# Patient Record
Sex: FEMALE | Race: WHITE | HISPANIC OR LATINO | Employment: FULL TIME | URBAN - METROPOLITAN AREA
[De-identification: names, ages, dates, MRNs, and addresses within clinical notes are randomized per-mention and may not be internally consistent; named-entity substitution may affect disease eponyms.]

---

## 2017-12-29 ENCOUNTER — ALLSCRIPTS OFFICE VISIT (OUTPATIENT)
Dept: OTHER | Facility: OTHER | Age: 27
End: 2017-12-29

## 2017-12-29 ENCOUNTER — LAB REQUISITION (OUTPATIENT)
Dept: LAB | Facility: HOSPITAL | Age: 27
End: 2017-12-29
Payer: COMMERCIAL

## 2017-12-29 DIAGNOSIS — R10.2 PELVIC AND PERINEAL PAIN: ICD-10-CM

## 2017-12-29 DIAGNOSIS — Z11.3 ENCOUNTER FOR SCREENING FOR INFECTIONS WITH PREDOMINANTLY SEXUAL MODE OF TRANSMISSION: ICD-10-CM

## 2017-12-29 DIAGNOSIS — Z01.419 ENCOUNTER FOR GYNECOLOGICAL EXAMINATION WITHOUT ABNORMAL FINDING: ICD-10-CM

## 2017-12-29 PROCEDURE — 87491 CHLMYD TRACH DNA AMP PROBE: CPT | Performed by: NURSE PRACTITIONER

## 2017-12-29 PROCEDURE — G0145 SCR C/V CYTO,THINLAYER,RESCR: HCPCS | Performed by: NURSE PRACTITIONER

## 2017-12-29 PROCEDURE — 87591 N.GONORRHOEAE DNA AMP PROB: CPT | Performed by: NURSE PRACTITIONER

## 2018-01-03 LAB
CHLAMYDIA DNA CVX QL NAA+PROBE: NORMAL
N GONORRHOEA DNA GENITAL QL NAA+PROBE: NORMAL

## 2018-01-05 ENCOUNTER — GENERIC CONVERSION - ENCOUNTER (OUTPATIENT)
Dept: OTHER | Facility: OTHER | Age: 28
End: 2018-01-05

## 2018-01-08 ENCOUNTER — GENERIC CONVERSION - ENCOUNTER (OUTPATIENT)
Dept: OTHER | Facility: OTHER | Age: 28
End: 2018-01-08

## 2018-01-08 LAB
LAB AP GYN PRIMARY INTERPRETATION: NORMAL
LAB AP LMP: NORMAL
Lab: NORMAL

## 2018-01-23 VITALS
HEIGHT: 64 IN | SYSTOLIC BLOOD PRESSURE: 120 MMHG | WEIGHT: 165 LBS | DIASTOLIC BLOOD PRESSURE: 60 MMHG | BODY MASS INDEX: 28.17 KG/M2

## 2018-01-23 NOTE — MISCELLANEOUS
Message   Recorded as Task   Date: 01/05/2018 08:04 AM, Created By: Latrice Hand   Task Name: Follow Up   Assigned To: Lencho Duncan   Regarding Patient: Law Trent, Status: In Progress   Comment:    Trinh Katz - 05 Jan 2018 8:04 AM     TASK CREATED  pls notify gc/ct were nml,thx   Oneda Roes - 05 Jan 2018 8:04 AM     TASK IN PROGRESS   Oneda Roes - 05 Jan 2018 8:05 AM     TASK EDITED  results given        Active Problems    1  Encounter for gynecological examination with abnormal finding (V72 31) (Z01 411)   2  Pelvic pain in female (625 9) (R10 2)    Current Meds   1  Aviane 0 1-20 MG-MCG Oral Tablet; TAKE 1 TABLET DAILY AS DIRECTED; Therapy: 21LBD1690 to ([id number])  Requested for: 04Buq3496; Last   Rx:[id number] Ordered   2  Fluconazole 150 MG Oral Tablet (Diflucan); TAKE 1 TABLET 1 TIME ONLY; Therapy: 55RHW0397 to (Evaluate:01Jan2018)  Requested for: 49Dcm1145; Last   Rx:[id number] Ordered   3  Xanax TABS (ALPRAZolam); Therapy: (Recorded:29Dec2017) to Recorded    Allergies    1   No Known Drug Allergies    Signatures   Electronically signed by : Maciel De Souza, ; Jan 5 2018  8:05AM EST                       (Author)

## 2018-01-23 NOTE — RESULT NOTES
Verified Results  (1) CHLAMYDIA/GC AMPLIFIED DNA, PCR [id number] 09:18AM Trinh Katz     Test Name Result Flag Reference   CHLAMYDIA,AMPLIFIED DNA PROBE   C  trachomatis Amplified DNA Negative   C  trachomatis Amplified DNA Negative   N  GONORRHOEAE AMPLIFIED DNA   N  gonorrhoeae Amplified DNA Negative   N  gonorrhoeae Amplified DNA Negative

## 2018-01-23 NOTE — RESULT NOTES
Verified Results  (1) THIN PREP PAP WITH IMAGING 30Wgr4660 09:19AM Ragini Alston Order Number: [id number]     Test Name Result Flag Reference   LAB AP CASE REPORT (Report)     Gynecologic Cytology Report            Case: [id number]                  Authorizing Provider: TIMA Faulkner    Collected:      12/29/2017 0919        First Screen:     LAURENCE Collier    Received:      01/04/2018 4161        Specimen:  LIQUID-BASED PAP, SCREENING, Cervix   LAB AP GYN PRIMARY INTERPRETATION      Negative for intraepithelial lesion or malignancy  Electronically signed by LAURENCE Collier on 1/8/2018 at 2:06 PM   LAB AP GYN SPECIMEN ADEQUACY      Satisfactory for evaluation  Endocervical/transformation zone component present  LAB AP GYN ADDITIONAL INFORMATION (Report)     [facility]'s FDA approved ,  and ThinPrep Imaging System are   utilized with strict adherence to the 's instruction manual to   prepare gynecologic and non-gynecologic cytology specimens for the   production of ThinPrep slides as well as for gynecologic ThinPrep imaging  These processes have been validated by our laboratory and/or by the     The Pap test is not a diagnostic procedure and should not be used as the   sole means to detect cervical cancer  It is only a screening procedure to   aid in the detection of cervical cancer and its precursors  Both   false-negative and false-positive results have been experienced  Your   patient's test result should be interpreted in this context together with   the history and clinical findings     LAB AP LMP 12/15/2017

## 2018-05-15 ENCOUNTER — TELEPHONE (OUTPATIENT)
Dept: OBGYN CLINIC | Age: 28
End: 2018-05-15

## 2018-05-15 DIAGNOSIS — Z30.41 ENCOUNTER FOR SURVEILLANCE OF CONTRACEPTIVE PILLS: Primary | ICD-10-CM

## 2018-05-15 RX ORDER — LEVONORGESTREL AND ETHINYL ESTRADIOL 0.1-0.02MG
1 KIT ORAL DAILY
Qty: 28 TABLET | Refills: 0 | Status: SHIPPED | OUTPATIENT
Start: 2018-05-15 | End: 2018-06-26 | Stop reason: SDUPTHER

## 2018-05-15 NOTE — TELEPHONE ENCOUNTER
Spoke with Pt today via phone call  Pt informed that her prescription for Aviane (OCP), will be forwarded electronically to Pt's pharmacy listed in EHR, pending TIMA Katz's signature  Reiterated to Pt that if she has any questions/concerns, to contact office

## 2018-05-15 NOTE — TELEPHONE ENCOUNTER
Pt called stating she lost her B/C pills and wanted to know if we could call in another RX  Pharmacy [facility] - [address], [place]

## 2018-06-26 DIAGNOSIS — Z30.41 ENCOUNTER FOR SURVEILLANCE OF CONTRACEPTIVE PILLS: ICD-10-CM

## 2018-06-26 RX ORDER — LEVONORGESTREL AND ETHINYL ESTRADIOL 0.1-0.02MG
KIT ORAL
Qty: 28 TABLET | Refills: 6 | Status: SHIPPED | OUTPATIENT
Start: 2018-06-26 | End: 2019-01-06 | Stop reason: SDUPTHER

## 2018-07-24 ENCOUNTER — TELEPHONE (OUTPATIENT)
Dept: OBGYN CLINIC | Age: 28
End: 2018-07-24

## 2018-07-24 DIAGNOSIS — N76.0 ACUTE VAGINITIS: Primary | ICD-10-CM

## 2018-07-24 RX ORDER — FLUCONAZOLE 150 MG/1
150 TABLET ORAL ONCE
Qty: 3 TABLET | Refills: 0 | Status: SHIPPED | OUTPATIENT
Start: 2018-07-24 | End: 2018-07-24

## 2018-07-24 NOTE — TELEPHONE ENCOUNTER
Pt  Would like for you to call in something for her  She has itching and a slight odor  Please advise    cv

## 2018-10-19 ENCOUNTER — TELEPHONE (OUTPATIENT)
Dept: OBGYN CLINIC | Age: 28
End: 2018-10-19

## 2018-10-19 NOTE — TELEPHONE ENCOUNTER
Pt would like a refill on diflucan    She is experiencing itching all day long   No burning or discharge    Please call pt

## 2018-11-19 ENCOUNTER — TELEPHONE (OUTPATIENT)
Dept: OBGYN CLINIC | Age: 28
End: 2018-11-19

## 2018-11-19 NOTE — TELEPHONE ENCOUNTER
Pt wants a std script, she has itching and discharge  She would like to speak with a nurse and a lab script      Thank you

## 2019-01-06 DIAGNOSIS — Z30.41 ENCOUNTER FOR SURVEILLANCE OF CONTRACEPTIVE PILLS: ICD-10-CM

## 2019-01-07 RX ORDER — LEVONORGESTREL AND ETHINYL ESTRADIOL 0.1-0.02MG
KIT ORAL
Qty: 28 TABLET | Refills: 0 | Status: SHIPPED | OUTPATIENT
Start: 2019-01-07 | End: 2019-01-25 | Stop reason: SDUPTHER

## 2019-01-25 ENCOUNTER — ANNUAL EXAM (OUTPATIENT)
Dept: OBGYN CLINIC | Age: 29
End: 2019-01-25
Payer: COMMERCIAL

## 2019-01-25 VITALS
SYSTOLIC BLOOD PRESSURE: 110 MMHG | DIASTOLIC BLOOD PRESSURE: 60 MMHG | HEIGHT: 63 IN | BODY MASS INDEX: 28.88 KG/M2 | WEIGHT: 163 LBS

## 2019-01-25 DIAGNOSIS — B37.49 CANDIDA INFECTION OF GENITAL REGION: ICD-10-CM

## 2019-01-25 DIAGNOSIS — Z01.419 ENCOUNTER FOR GYNECOLOGICAL EXAMINATION WITHOUT ABNORMAL FINDING: Primary | ICD-10-CM

## 2019-01-25 DIAGNOSIS — Z30.41 ENCOUNTER FOR SURVEILLANCE OF CONTRACEPTIVE PILLS: ICD-10-CM

## 2019-01-25 DIAGNOSIS — Z11.3 SCREENING FOR STD (SEXUALLY TRANSMITTED DISEASE): ICD-10-CM

## 2019-01-25 PROCEDURE — 87491 CHLMYD TRACH DNA AMP PROBE: CPT | Performed by: NURSE PRACTITIONER

## 2019-01-25 PROCEDURE — 99395 PREV VISIT EST AGE 18-39: CPT | Performed by: NURSE PRACTITIONER

## 2019-01-25 PROCEDURE — 87591 N.GONORRHOEAE DNA AMP PROB: CPT | Performed by: NURSE PRACTITIONER

## 2019-01-25 RX ORDER — LEVONORGESTREL AND ETHINYL ESTRADIOL 0.1-0.02MG
1 KIT ORAL DAILY
Qty: 84 TABLET | Refills: 3 | Status: SHIPPED | OUTPATIENT
Start: 2019-01-25 | End: 2019-07-01 | Stop reason: SDUPTHER

## 2019-01-25 RX ORDER — FLUCONAZOLE 150 MG/1
150 TABLET ORAL ONCE
Qty: 2 TABLET | Refills: 2 | Status: SHIPPED | OUTPATIENT
Start: 2019-01-25 | End: 2019-01-25

## 2019-01-25 NOTE — PATIENT INSTRUCTIONS
Vulvovaginal Candidiasis   AMBULATORY CARE:   Vulvovaginal candidiasis,  or yeast infection, is a common vaginal infection  Vulvovaginal candidiasis is caused by a fungus, or yeast-like germ  Fungi are normally found in your vagina  When there are too many fungi, it can cause an infection  Seek care immediately if:   · You have fever and chills  · You are bleeding from your vagina and it is not your monthly period  · You develop abdominal or pelvic pain  Contact your healthcare provider if:   · Your signs and symptoms get worse, even after treatment  · You have questions or concerns about your condition or care  Signs and symptoms:   · Thick, white, cheese-like discharge from your vagina    · Itching, swelling, or redness in your vagina    · Burning when you urinate  Treatment for vulvovaginal candidiasis  includes medicines to treat the fungal infection and decrease inflammation  The medicine may be a pill, topical cream, or vaginal suppository  Manage your symptoms:   · Wear cotton underwear  · Keep the vaginal area clean and dry  · Do not have sex until your symptoms are gone  · Do not douche  · Do not use feminine hygiene sprays, powders, or bubble bath  Prevent another infection:   · Take showers instead of baths  · Eat yogurt  · Limit the amount of alcohol you drink  · Control your blood sugar if you are diabetic  · Limit your time in hot tubs  Follow up with your healthcare provider as directed:  Write down your questions so you remember to ask them during your visits  © 2017 [employer] Information is for End User's use only and may not be sold, redistributed or otherwise used for commercial purposes  All illustrations and images included in CareNotes® are the copyrighted property of [employer]  or [employer]  The above information is an  only   It is not intended as medical advice for individual conditions or treatments  Talk to your doctor, nurse or pharmacist before following any medical regimen to see if it is safe and effective for you

## 2019-01-25 NOTE — PROGRESS NOTES
Diagnoses and all orders for this visit:    Encounter for gynecological examination without abnormal finding    Candida infection of genital region  -     fluconazole (DIFLUCAN) 150 mg tablet; Take 1 tablet (150 mg total) by mouth once for 1 dose Once and repeat in 3 days    Screening for STD (sexually transmitted disease)  -     Chlamydia/GC amplified DNA by PCR    Encounter for surveillance of contraceptive pills  -     levonorgestrel-ethinyl estradiol ([address]) 0 1-20 MG-MCG per tablet; Take 1 tablet by mouth daily    Other orders  -     Cancel: Liquid-based pap, screening          Calcium/vit d inclusion in the diet discussed, call with any issues, SBE reinforced, all concerns addressed  Pleasant 34 y o  premenopausal female here for annual exam  She denies any issues with bleeding or her menses  Reports regular cycles on ocp  Denies history of abnormal pap smears  Last Pap 12/2017, no pap today  Denies vaginal issues but gets occasional yeast infections  Denies pelvic pain-sxs from last yr resolved  Denies any issues with her BCM but sometimes gets crabtree-does not want to change ocp though  Sexually active without any concerns, recently had a new partner       Past Medical History:   Diagnosis Date    No known health problems      Past Surgical History:   Procedure Laterality Date    NO PAST SURGERIES       Family History   Problem Relation Age of Onset    Ovarian cancer Maternal Grandmother     Breast cancer Neg Hx     Colon cancer Neg Hx     Uterine cancer Neg Hx     Cervical cancer Neg Hx      Social History   Substance Use Topics    Smoking status: Former Smoker     Quit date: 2016    Smokeless tobacco: Never Used    Alcohol use Yes       Current Outpatient Prescriptions:     levonorgestrel-ethinyl estradiol (SRONYX) 0 1-20 MG-MCG per tablet, Take 1 tablet by mouth daily, Disp: 84 tablet, Rfl: 3    fluconazole (DIFLUCAN) 150 mg tablet, Take 1 tablet (150 mg total) by mouth once for 1 dose Once and repeat in 3 days, Disp: 2 tablet, Rfl: 2  Patient Active Problem List    Diagnosis Date Noted    Candida infection of genital region 2019    Encounter for gynecological examination without abnormal finding 2019       Allergies   Allergen Reactions    Venlafaxine        OB History    Para Term  AB Living   1 0     1 0   SAB TAB Ectopic Multiple Live Births           0      # Outcome Date GA Lbr Kaden/2nd Weight Sex Delivery Anes PTL Lv   1 AB               Finishing up on her masters in [employer] [employer]- 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  Vitals:    19 1100   BP: 110/60   BP Location: Right arm   Patient Position: Sitting   Weight: 73 9 kg (163 lb)   Height: 5' 3" (1 6 m)     Body mass index is 28 87 kg/m²  Review of Systems   Constitutional: Negative for chills, fatigue, fever and unexpected weight change  Respiratory: Negative for shortness of breath  Gastrointestinal: Negative for anal bleeding, blood in stool, constipation and diarrhea  Genitourinary: Negative for difficulty urinating, dysuria and hematuria  Physical Exam   Constitutional: She appears well-developed and well-nourished  No distress  HENT:   Head: Normocephalic  Neck: Normal range of motion  Neck supple  Pulmonary: Effort normal   Breasts: bilateral without masses, skin changes or nipple discharge  Bilaterally soft and warm to touch  No areas of erythema or pain  Abdominal: Soft  Pelvic exam was performed with patient supine  No labial fusion  There is no rash, tenderness, lesion or injury on the right labia  There is no rash, tenderness, lesion or injury on the left labia  Uterus is not deviated, not enlarged, not fixed and not tender  Cervix exhibits no motion tenderness, no discharge and no friability  Right adnexum displays no mass, no tenderness and no fullness  Left adnexum displays no mass, no tenderness and no fullness   No erythema or tenderness in the vagina  No foreign body in the vagina  No signs of injury around the vagina  No vaginal discharge found  Lymphadenopathy:        Right: No inguinal adenopathy present  Left: No inguinal adenopathy present

## 2019-01-28 LAB
C TRACH DNA SPEC QL NAA+PROBE: NEGATIVE
N GONORRHOEA DNA SPEC QL NAA+PROBE: NEGATIVE

## 2019-02-06 DIAGNOSIS — B37.3 YEAST INFECTION OF THE VAGINA: Primary | ICD-10-CM

## 2019-02-06 NOTE — TELEPHONE ENCOUNTER
Pt said at yly - she only had external itching  No dsch  Took 2 diflucan as directed - itching got worse  She still has no dsch, no odor, not swollen  Just itches  Cream - mycolog ? ?   Thanks

## 2019-02-06 NOTE — TELEPHONE ENCOUNTER
Pt will use internal cream for 7 night - also use it externally - on vulva - bid    Rx to [facility]

## 2019-02-06 NOTE — TELEPHONE ENCOUNTER
Pt was prescribed fluconazole 2 weeks ago and the itching has become increasingly worse  Pt would like for us to [facility]

## 2019-04-03 ENCOUNTER — TELEPHONE (OUTPATIENT)
Dept: OBGYN CLINIC | Facility: CLINIC | Age: 29
End: 2019-04-03

## 2019-04-05 ENCOUNTER — OFFICE VISIT (OUTPATIENT)
Dept: OBGYN CLINIC | Facility: CLINIC | Age: 29
End: 2019-04-05
Payer: COMMERCIAL

## 2019-04-05 VITALS
SYSTOLIC BLOOD PRESSURE: 104 MMHG | DIASTOLIC BLOOD PRESSURE: 76 MMHG | BODY MASS INDEX: 26.29 KG/M2 | HEIGHT: 64 IN | WEIGHT: 154 LBS

## 2019-04-05 DIAGNOSIS — N76.1 CHRONIC VAGINITIS: Primary | ICD-10-CM

## 2019-04-05 PROCEDURE — 87510 GARDNER VAG DNA DIR PROBE: CPT | Performed by: NURSE PRACTITIONER

## 2019-04-05 PROCEDURE — 87660 TRICHOMONAS VAGIN DIR PROBE: CPT | Performed by: NURSE PRACTITIONER

## 2019-04-05 PROCEDURE — 87480 CANDIDA DNA DIR PROBE: CPT | Performed by: NURSE PRACTITIONER

## 2019-04-05 PROCEDURE — 99214 OFFICE O/P EST MOD 30 MIN: CPT | Performed by: NURSE PRACTITIONER

## 2019-04-05 RX ORDER — NYSTATIN 100000 U/G
CREAM TOPICAL 2 TIMES DAILY
Qty: 30 G | Refills: 1 | Status: SHIPPED | OUTPATIENT
Start: 2019-04-05 | End: 2021-10-28 | Stop reason: ALTCHOICE

## 2019-04-05 RX ORDER — FLUCONAZOLE 150 MG/1
TABLET ORAL
Qty: 3 TABLET | Refills: 1 | Status: SHIPPED | OUTPATIENT
Start: 2019-04-05 | End: 2019-04-12

## 2019-04-07 LAB
CANDIDA RRNA VAG QL PROBE: NEGATIVE
G VAGINALIS RRNA GENITAL QL PROBE: POSITIVE
T VAGINALIS RRNA GENITAL QL PROBE: NEGATIVE

## 2019-04-08 ENCOUNTER — TELEPHONE (OUTPATIENT)
Dept: OBGYN CLINIC | Facility: CLINIC | Age: 29
End: 2019-04-08

## 2019-04-08 DIAGNOSIS — B96.89 BV (BACTERIAL VAGINOSIS): Primary | ICD-10-CM

## 2019-04-08 DIAGNOSIS — N76.0 BV (BACTERIAL VAGINOSIS): Primary | ICD-10-CM

## 2019-04-08 RX ORDER — METRONIDAZOLE 7.5 MG/G
1 GEL VAGINAL
Qty: 70 G | Refills: 0 | Status: SHIPPED | OUTPATIENT
Start: 2019-04-08 | End: 2019-04-13

## 2019-06-20 ENCOUNTER — TELEPHONE (OUTPATIENT)
Dept: OBGYN CLINIC | Facility: CLINIC | Age: 29
End: 2019-06-20

## 2019-06-25 ENCOUNTER — TELEPHONE (OUTPATIENT)
Dept: OBGYN CLINIC | Facility: CLINIC | Age: 29
End: 2019-06-25

## 2019-06-25 DIAGNOSIS — N76.0 BV (BACTERIAL VAGINOSIS): Primary | ICD-10-CM

## 2019-06-25 DIAGNOSIS — B96.89 BV (BACTERIAL VAGINOSIS): Primary | ICD-10-CM

## 2019-06-25 DIAGNOSIS — Z30.41 ENCOUNTER FOR SURVEILLANCE OF CONTRACEPTIVE PILLS: ICD-10-CM

## 2019-07-01 RX ORDER — LEVONORGESTREL AND ETHINYL ESTRADIOL 0.1-0.02MG
1 KIT ORAL DAILY
Qty: 84 TABLET | Refills: 1 | Status: SHIPPED | OUTPATIENT
Start: 2019-07-01 | End: 2019-07-29 | Stop reason: ALTCHOICE

## 2019-07-01 RX ORDER — METRONIDAZOLE 7.5 MG/G
1 GEL VAGINAL
Qty: 45 G | Refills: 0 | Status: SHIPPED | OUTPATIENT
Start: 2019-07-01 | End: 2019-07-06

## 2019-07-29 ENCOUNTER — OFFICE VISIT (OUTPATIENT)
Dept: OBGYN CLINIC | Facility: CLINIC | Age: 29
End: 2019-07-29
Payer: COMMERCIAL

## 2019-07-29 VITALS
HEIGHT: 64 IN | WEIGHT: 156 LBS | BODY MASS INDEX: 26.63 KG/M2 | SYSTOLIC BLOOD PRESSURE: 118 MMHG | DIASTOLIC BLOOD PRESSURE: 70 MMHG

## 2019-07-29 DIAGNOSIS — B37.49 CANDIDA INFECTION OF GENITAL REGION: ICD-10-CM

## 2019-07-29 DIAGNOSIS — N92.1 BREAKTHROUGH BLEEDING ON BIRTH CONTROL PILLS: Primary | ICD-10-CM

## 2019-07-29 DIAGNOSIS — Z11.3 SCREENING FOR STDS (SEXUALLY TRANSMITTED DISEASES): ICD-10-CM

## 2019-07-29 PROCEDURE — 99213 OFFICE O/P EST LOW 20 MIN: CPT | Performed by: NURSE PRACTITIONER

## 2019-07-29 PROCEDURE — 87491 CHLMYD TRACH DNA AMP PROBE: CPT | Performed by: NURSE PRACTITIONER

## 2019-07-29 PROCEDURE — 87591 N.GONORRHOEAE DNA AMP PROB: CPT | Performed by: NURSE PRACTITIONER

## 2019-07-29 RX ORDER — ALPRAZOLAM 0.25 MG/1
TABLET ORAL
COMMUNITY
Start: 2019-07-22

## 2019-07-29 RX ORDER — DESOGESTREL AND ETHINYL ESTRADIOL 0.15-0.03
1 KIT ORAL DAILY
Qty: 28 TABLET | Refills: 2 | Status: SHIPPED | OUTPATIENT
Start: 2019-07-29 | End: 2019-08-19 | Stop reason: ALTCHOICE

## 2019-07-29 RX ORDER — ESCITALOPRAM OXALATE 5 MG/1
TABLET ORAL
COMMUNITY
Start: 2019-07-22 | End: 2021-10-28 | Stop reason: ALTCHOICE

## 2019-07-29 RX ORDER — FLUCONAZOLE 150 MG/1
150 TABLET ORAL ONCE
Qty: 2 TABLET | Refills: 0 | Status: SHIPPED | OUTPATIENT
Start: 2019-07-29 | End: 2019-07-29

## 2019-07-29 NOTE — PATIENT INSTRUCTIONS
Birth Control Pills   WHAT YOU NEED TO KNOW:   What are birth control pills? Birth control pills are also called oral contraceptives, or the pill  It is medicine that helps prevent pregnancy  Birth control pills work by preventing ovulation  Ovulation is when the ovaries make and release an egg cell each month  If this egg gets fertilized by sperm, pregnancy occurs  Birth control pills may also help to prevent pregnancy by keeping sperm from fertilizing an egg  What may be done before I can start taking birth control pills? You need to see your healthcare provider to get a prescription  Any of the following may be done before your healthcare provider gives you a prescription:  · Your healthcare provider will ask you about diseases and illnesses you have had in the past  He will check your risk for blood clots, heart conditions, or stroke  He will also check your blood pressure, and may do a breast and pelvic exam  A Pap smear may also be done during the pelvic exam  This is a test to make sure you do not have abnormal changes on your cervix  You may need other tests, such as a urine test, to make sure you are not pregnant  · Your healthcare provider will ask if you take any medicines and if you smoke  Smoking increases your risk for stroke, heart attack, or a blood clot in your lungs  If you smoke, you should not take certain kinds of birth control pills  What are the advantages of birth control pills? When birth control pills are used correctly, the chances of getting pregnant are very low  Birth control pills may help decrease bleeding and pain during your monthly period  They may also help prevent cancer of the uterus and ovaries  What are the disadvantages of birth control pills? You may have sudden changes in your mood or feelings while you take birth control pills  You may have nausea and decreased sex drive  You may have an increased appetite and rapid weight gain   You may also have bleeding in between periods, less frequent periods, vaginal dryness, and breast pain  Birth control pills will not protect you from sexually transmitted infections  Rarely, some birth control pills can increase your risk for a blood clot  This may become life-threatening  What should I do if I decide I want to get pregnant? If you are planning to have a baby, ask your healthcare provider when you may stop taking your birth control pills  It may take some time for you to start ovulating again  Ask your healthcare provider for more information about pregnancy after birth control pills  When should I start taking birth control pills after I have a baby? If you are not breastfeeding, you may start taking birth control pills 3 weeks after you give birth  You may be able to take certain types of birth control pills if you are breastfeeding  These pills can be started from 6 weeks to 6 months after you give birth  Ask your healthcare provider for more information about when to start taking birth control pills after you give birth  When should I contact my healthcare provider? · You have forgotten to take a birth control pill  · You have mood changes, such as depression, since starting birth control pills  · You have nausea or you are vomiting  · You have severe abdominal pain  · You missed a period and have questions or concerns about being pregnant  · You still have bleeding 4 months after taking birth control pills correctly  · You have questions or concerns about your condition or care  When should I seek immediate care or call 911? · Your arm or leg feels warm, tender, and painful  It may look swollen and red  · You feel lightheaded, short of breath, and have chest pain  · You cough up blood      · You have any of the following signs of a stroke:      ¨ Numbness or drooping on one side of your face     ¨ Weakness in an arm or leg    ¨ Confusion or difficulty speaking    ¨ Dizziness, a severe headache, or vision loss    · You have severe pain, numbness, or swelling in your arms or legs  CARE AGREEMENT:   You have the right to help plan your care  Learn about your health condition and how it may be treated  Discuss treatment options with your caregivers to decide what care you want to receive  You always have the right to refuse treatment  The above information is an  only  It is not intended as medical advice for individual conditions or treatments  Talk to your doctor, nurse or pharmacist before following any medical regimen to see if it is safe and effective for you  © 2017 [facility] Information is for End User's use only and may not be sold, redistributed or otherwise used for commercial purposes  All illustrations and images included in CareNotes® are the copyrighted property of [employer] M [employer]  or [employer]

## 2019-07-31 LAB
C TRACH DNA SPEC QL NAA+PROBE: NEGATIVE
N GONORRHOEA DNA SPEC QL NAA+PROBE: NEGATIVE

## 2019-08-15 ENCOUNTER — TELEPHONE (OUTPATIENT)
Dept: OBGYN CLINIC | Facility: CLINIC | Age: 29
End: 2019-08-15

## 2019-08-15 DIAGNOSIS — IMO0001 CONTRACEPTION: Primary | ICD-10-CM

## 2019-08-15 NOTE — TELEPHONE ENCOUNTER
Pt was put on [facility] and she is having some bad side effects and would like to know if it can be change  Not sure if its 100% the pill, but hair falling out, mood up and down and have not got menstrual, or have a sex drive since she started the pill  Please call for more detail

## 2019-08-15 NOTE — TELEPHONE ENCOUNTER
Patient was seen on 07/29 for BTB  Her OCP was changed from Sronyx to Vidya - currently in the third week of her first pack  She states she does not like it  She is feeling lethargic, hair is falling out, crabtree and has no sex drive since starting this OCP  She wants to try [facility]  OK to change? *Patient aware no response till Monday

## 2019-08-19 RX ORDER — ETONOGESTREL AND ETHINYL ESTRADIOL 11.7; 2.7 MG/1; MG/1
INSERT, EXTENDED RELEASE VAGINAL
Qty: 3 EACH | Refills: 0 | Status: SHIPPED | OUTPATIENT
Start: 2019-08-19 | End: 2019-10-15 | Stop reason: ALTCHOICE

## 2019-08-19 NOTE — TELEPHONE ENCOUNTER
Contacted pt # [phone]- recv vm- per hippa communication on file left message advising pt as directed and to contact the office should she have any questions or concerns  Phone in nuvaring to [employer] on file   Please sign

## 2019-09-09 ENCOUNTER — APPOINTMENT (OUTPATIENT)
Dept: LAB | Facility: CLINIC | Age: 29
End: 2019-09-09
Payer: COMMERCIAL

## 2019-09-09 ENCOUNTER — OFFICE VISIT (OUTPATIENT)
Dept: OBGYN CLINIC | Age: 29
End: 2019-09-09
Payer: COMMERCIAL

## 2019-09-09 VITALS
DIASTOLIC BLOOD PRESSURE: 84 MMHG | WEIGHT: 158.2 LBS | BODY MASS INDEX: 28.03 KG/M2 | SYSTOLIC BLOOD PRESSURE: 118 MMHG | HEIGHT: 63 IN

## 2019-09-09 DIAGNOSIS — Z11.3 SCREENING FOR STD (SEXUALLY TRANSMITTED DISEASE): ICD-10-CM

## 2019-09-09 DIAGNOSIS — N76.1 CHRONIC VAGINITIS: ICD-10-CM

## 2019-09-09 LAB — RPR SER QL: NORMAL

## 2019-09-09 PROCEDURE — 86803 HEPATITIS C AB TEST: CPT

## 2019-09-09 PROCEDURE — 36415 COLL VENOUS BLD VENIPUNCTURE: CPT

## 2019-09-09 PROCEDURE — 86592 SYPHILIS TEST NON-TREP QUAL: CPT

## 2019-09-09 PROCEDURE — 87491 CHLMYD TRACH DNA AMP PROBE: CPT | Performed by: NURSE PRACTITIONER

## 2019-09-09 PROCEDURE — 87389 HIV-1 AG W/HIV-1&-2 AB AG IA: CPT

## 2019-09-09 PROCEDURE — 87591 N.GONORRHOEAE DNA AMP PROB: CPT | Performed by: NURSE PRACTITIONER

## 2019-09-09 PROCEDURE — 87660 TRICHOMONAS VAGIN DIR PROBE: CPT | Performed by: NURSE PRACTITIONER

## 2019-09-09 PROCEDURE — 99212 OFFICE O/P EST SF 10 MIN: CPT | Performed by: NURSE PRACTITIONER

## 2019-09-09 PROCEDURE — 87480 CANDIDA DNA DIR PROBE: CPT | Performed by: NURSE PRACTITIONER

## 2019-09-09 PROCEDURE — 87340 HEPATITIS B SURFACE AG IA: CPT

## 2019-09-09 PROCEDURE — 87510 GARDNER VAG DNA DIR PROBE: CPT | Performed by: NURSE PRACTITIONER

## 2019-09-09 RX ORDER — FLUCONAZOLE 150 MG/1
TABLET ORAL
Qty: 3 TABLET | Refills: 0 | Status: SHIPPED | OUTPATIENT
Start: 2019-09-09 | End: 2019-09-16

## 2019-09-09 RX ORDER — METHYLPHENIDATE HYDROCHLORIDE 18 MG/1
18 TABLET ORAL EVERY MORNING
Refills: 0 | COMMUNITY
Start: 2019-07-22 | End: 2021-10-28 | Stop reason: ALTCHOICE

## 2019-09-09 RX ORDER — CLOTRIMAZOLE 1 %
CREAM (GRAM) TOPICAL 2 TIMES DAILY
Qty: 30 G | Refills: 0 | Status: SHIPPED | OUTPATIENT
Start: 2019-09-09 | End: 2021-10-28 | Stop reason: ALTCHOICE

## 2019-09-09 NOTE — PATIENT INSTRUCTIONS
Safe Sex   AMBULATORY CARE:   Safe sex  is a combination of practices taken to prevent pregnancy and the spread of sexually transmitted infections (STIs)  These practices help to decrease or prevent the exchange of body fluids during sexual contact  Body fluids include saliva, urine, blood, vaginal fluids, and semen  All types of sex can cause STIs  This includes oral, vaginal, and anal sex  Seek care immediately if:   · A condom breaks, leaks, or slips off while you are having sex  · You notice sores on your penis, vagina, anal area, or skin around them  · You have had unsafe sex and want to discuss emergency contraception or treatment for STI exposure  Contact your healthcare provider if:   · You think you might be pregnant  · You have questions or concerns about your condition or care  How to practice safe sex:  Talk to your partner before you have sex  Ask about his or her sexual history and any current or past STI  · Use condoms and barrier methods for all types of sexual contact  Use a new condom or latex barrier each time you have sex  This includes oral, vaginal, and anal sex  Make sure that the condom fits and is put on correctly  Rubber latex sheets or dental dams can be used for oral sex  Ask your healthcare provider how to use these items and where to purchase them  If you are allergic to latex, use a nonlatex product such as polyurethane  · Limit your number of sexual partners  More than one sex partner can increase your risk for an STI  Do not have sex with anyone whose sexual history you do not know  · Do not do activities that can pass germs  Do not use saliva as a lubricant or share sex toys  · Tell your sex partner if you have an STI  Your partner may need to be tested and treated  Do not have sex while you are being treated for an STI, or with a partner who is being treated  · Get tested regularly for STIs    Get tested if you have had sexual contact with someone who has an STI  Get tested if you have unprotected sex with any new partner  · Get vaccinated  Vaccines may help to lower your risk for an STI such as HPV, hepatitis A, or hepatitis B  Ask your healthcare provider for more information on vaccines  Other ways to practice safe sex:   · Only use water-based lubricants during sex  Water-based lubricants may prevent sores or cuts in the vagina or penis  Prevent sores or cuts to decrease your risk for an STI  Do not use oil-based lubricants, such as baby oil or hand lotion, with latex condoms or barriers  These will weaken the latex and may cause it to break  · Do not use chemical irritants on condoms or genitals  Products that contain chemical irritants, such as spermicides, can irritate the lining of your vagina or rectum  Irritation may cause sores that may increase your risk for an STI  · Be careful when you have sex if you have open sores or cuts  Open sores or cuts may increase your risk for an STI  This includes new piercings and tattoos  Keep all open sores or cuts covered during sex  Do not have oral sex if you have cuts or sores in your mouth  Ask your healthcare provider when it is safe to have sex after you get a tattoo or piercing  · Do not use alcohol or drugs before sex  These substances can prevent you from thinking clearly and increase your risk for unsafe sex  Follow up with your healthcare provider as directed:  Write down your questions so you remember to ask them during your visits  © 2017 [employer] Information is for End User's use only and may not be sold, redistributed or otherwise used for commercial purposes  All illustrations and images included in CareNotes® are the copyrighted property of [employer]  or [employer]  The above information is an  only  It is not intended as medical advice for individual conditions or treatments   Talk to your doctor, nurse or pharmacist before following any medical regimen to see if it is safe and effective for you

## 2019-09-09 NOTE — PROGRESS NOTES
Diagnoses and all orders for this visit:    1  Screening for STD (sexually transmitted disease)  -     HIV 1/2 AG-AB combo; Future  -     Hepatitis B surface antigen; Future  -     Hepatitis C antibody; Future  -     RPR; Future  -     Chlamydia/GC amplified DNA by PCR  Reviewed safe sex practices  Will treat further based on results  2, Chronic vaginitis    -     fluconazole (DIFLUCAN) 150 mg tablet; Take 1 tablet on days 1,3 and 7   -     clotrimazole (LOTRIMIN) 1 % cream; Apply topically 2 (two) times a day for 14 days  -     VAGINOSIS DNA PROBE (AFFIRM)  Reviewed vaginal hygiene, no douching, excessive feminine products, scented soaps and lotions  Keep vaginal area cool and dry  Drink lots of water and watch sugar intake  Can try a probiotic for vaginal tiny health  Call if symptoms persists or gets worse  Other orders    -     methylphenidate (CONCERTA) 18 mg ER tablet; Take 18 mg by mouth every morning      Call if no symptom improvement, all questions answered, return for annual         Pleasant 34 y o  here for vaginal complaints  She c/o vaginal pain and discomfort, itching and mild odor which developed 2 weeks after unprotected intercourse with a new partner  She denies any treatments tried  Denies recent antibiotic use  Denies douching  Denies fever, pelvic pain or dyspareunia  She is requesting STD testing including blood work and Trichomonas testing due to her vaginal symptoms        Past Medical History:   Diagnosis Date    Anxiety     Depression     No known health problems      Past Surgical History:   Procedure Laterality Date    NO PAST SURGERIES       Social History     Tobacco Use    Smoking status: Former Smoker     Last attempt to quit: 2016     Years since quitting: 3 6    Smokeless tobacco: Never Used   Substance Use Topics    Alcohol use: Yes     Comment: socially     Drug use: Yes     Types: Marijuana     Family History   Problem Relation Age of Onset    Ovarian cancer Maternal Grandmother     Uterine cancer Maternal Grandmother     Cervical cancer Maternal Grandmother     No Known Problems Mother     Clotting disorder Father     No Known Problems Sister     Breast cancer Neg Hx     Colon cancer Neg Hx        Current Outpatient Medications:     ALPRAZolam (XANAX) 0 25 mg tablet, , Disp: , Rfl:     escitalopram (LEXAPRO) 5 mg tablet, , Disp: , Rfl:     methylphenidate (CONCERTA) 18 mg ER tablet, Take 18 mg by mouth every morning, Disp: , Rfl: 0    nystatin (MYCOSTATIN) cream, Apply topically 2 (two) times a day for 7 days To external labia, Disp: 30 g, Rfl: 1    clotrimazole (LOTRIMIN) 1 % cream, Apply topically 2 (two) times a day for 14 days, Disp: 30 g, Rfl: 0    etonogestrel-ethinyl estradiol (NUVARING) 0 12-0 015 MG/24HR vaginal ring, Insert vaginally and leave in place for 3 consecutive weeks, then remove for 1 week  (Patient not taking: Reported on 2019), Disp: 3 each, Rfl: 0    fluconazole (DIFLUCAN) 150 mg tablet, Take 1 tablet on days 1,3 and 7 , Disp: 3 tablet, Rfl: 0    No Known Allergies  OB History    Para Term  AB Living   1 0     1 0   SAB TAB Ectopic Multiple Live Births     1     0      # Outcome Date GA Lbr Kaden/2nd Weight Sex Delivery Anes PTL Lv   1 TAB                Vitals:    19 0742   BP: 118/84   BP Location: Right arm   Patient Position: Sitting   Cuff Size: Standard   Weight: 71 8 kg (158 lb 3 2 oz)   Height: 5' 3" (1 6 m)     Body mass index is 28 02 kg/m²  Review of Systems   Constitutional: Negative for chills, fatigue, fever and unexpected weight change  Respiratory: Negative for shortness of breath  Gastrointestinal: Negative for anal bleeding, blood in stool, constipation and diarrhea  Genitourinary: Negative for difficulty urinating, dysuria and hematuria  Physical Exam   Constitutional: She appears well-developed and well-nourished  No distress  Alert and oriented    HENT: atraumatic  Head: Normocephalic  Neck: Normal range of motion  Neck supple  Pulmonary: Effort normal   Abdominal: Soft  Pelvic exam was performed with patient supine  No labial fusion  There is erythema, mild tenderness, w/o lesion or injury on the right and left inner labia minora  Urethral meatus does not show any tenderness, inflammation or discharge  Palpation of midline bladder without pain or discomfort  Uterus is not deviated, not enlarged, not fixed and not tender  Cervix exhibits no motion tenderness, no discharge and no friability  Right adnexum displays no mass, no tenderness and no fullness  Left adnexum displays no mass, no tenderness and no fullness  No erythema or tenderness in the vagina  No foreign body in the vagina  No signs of injury around the vagina  Small amount of white vaginal discharge found  STD/Affirm cultures collected  Perineum and anus without areas of injury  No lesions noted or swelling  Lymphadenopathy:        Right: No inguinal adenopathy present  Left: No inguinal adenopathy present       PH 4 5, Negative WHIFF

## 2019-09-10 ENCOUNTER — TELEPHONE (OUTPATIENT)
Dept: OBGYN CLINIC | Facility: CLINIC | Age: 29
End: 2019-09-10

## 2019-09-10 DIAGNOSIS — B96.89 BV (BACTERIAL VAGINOSIS): Primary | ICD-10-CM

## 2019-09-10 DIAGNOSIS — N76.0 BV (BACTERIAL VAGINOSIS): Primary | ICD-10-CM

## 2019-09-10 LAB
C TRACH DNA SPEC QL NAA+PROBE: NEGATIVE
CANDIDA RRNA VAG QL PROBE: NEGATIVE
G VAGINALIS RRNA GENITAL QL PROBE: POSITIVE
HBV SURFACE AG SER QL: NORMAL
HCV AB SER QL: NORMAL
HIV 1+2 AB+HIV1 P24 AG SERPL QL IA: NORMAL
N GONORRHOEA DNA SPEC QL NAA+PROBE: NEGATIVE
T VAGINALIS RRNA GENITAL QL PROBE: NEGATIVE

## 2019-09-10 RX ORDER — METRONIDAZOLE 500 MG/1
500 TABLET ORAL EVERY 12 HOURS SCHEDULED
Qty: 14 TABLET | Refills: 0 | Status: SHIPPED | OUTPATIENT
Start: 2019-09-10 | End: 2019-09-17

## 2019-09-10 NOTE — TELEPHONE ENCOUNTER
----- Message from [address] sent at 9/10/2019  2:21 PM EDT -----  Please let pt know that her vaginal culture was positive for BV so rx sent for flagyl  No alcohol/intercourse while taking that medication and she can continue with other treatments as ordered as well  Call if symptoms do not improve    Thanks

## 2019-09-19 ENCOUNTER — TELEPHONE (OUTPATIENT)
Dept: OBGYN CLINIC | Facility: CLINIC | Age: 29
End: 2019-09-19

## 2019-09-19 NOTE — TELEPHONE ENCOUNTER
Pt called office today, left voicemail message stating "she wants to know results of recent STD tests "  Pt can be reached @ [phone]

## 2019-09-19 NOTE — TELEPHONE ENCOUNTER
Left lag that her STD results were negative per LD note and that a card was mailed to her 9/13/19  If she had any other questions or concerns to please call us back

## 2019-10-13 DIAGNOSIS — N92.1 BREAKTHROUGH BLEEDING ON BIRTH CONTROL PILLS: ICD-10-CM

## 2019-10-15 RX ORDER — DESOGESTREL AND ETHINYL ESTRADIOL 0.15-0.03
KIT ORAL
Qty: 84 TABLET | Refills: 1 | Status: SHIPPED | OUTPATIENT
Start: 2019-10-15 | End: 2020-08-25 | Stop reason: ALTCHOICE

## 2019-11-25 ENCOUNTER — OFFICE VISIT (OUTPATIENT)
Dept: OBGYN CLINIC | Age: 29
End: 2019-11-25
Payer: COMMERCIAL

## 2019-11-25 VITALS
BODY MASS INDEX: 27.64 KG/M2 | SYSTOLIC BLOOD PRESSURE: 112 MMHG | DIASTOLIC BLOOD PRESSURE: 64 MMHG | HEIGHT: 63 IN | WEIGHT: 156 LBS

## 2019-11-25 DIAGNOSIS — Z11.3 SCREEN FOR STD (SEXUALLY TRANSMITTED DISEASE): Primary | ICD-10-CM

## 2019-11-25 DIAGNOSIS — N89.8 VAGINAL ITCHING: ICD-10-CM

## 2019-11-25 PROBLEM — N76.0 ACUTE VAGINITIS: Status: ACTIVE | Noted: 2019-11-25

## 2019-11-25 PROCEDURE — 87661 TRICHOMONAS VAGINALIS AMPLIF: CPT | Performed by: NURSE PRACTITIONER

## 2019-11-25 PROCEDURE — 99213 OFFICE O/P EST LOW 20 MIN: CPT | Performed by: NURSE PRACTITIONER

## 2019-11-25 PROCEDURE — 87591 N.GONORRHOEAE DNA AMP PROB: CPT | Performed by: NURSE PRACTITIONER

## 2019-11-25 PROCEDURE — 87491 CHLMYD TRACH DNA AMP PROBE: CPT | Performed by: NURSE PRACTITIONER

## 2019-11-25 RX ORDER — CLINDAMYCIN AND BENZOYL PEROXIDE 10; 50 MG/G; MG/G
GEL TOPICAL
Refills: 2 | COMMUNITY
Start: 2019-09-26

## 2019-11-25 NOTE — PROGRESS NOTES
Diagnoses and all orders for this visit:    1  Screen for STD (sexually transmitted disease)/Vaginal itching  -     Chlamydia/GC amplified DNA by PCR  -     Hepatitis B surface antigen  -     Hepatitis C antibody  -     HIV 1/2 AG-AB combo  -     RPR  -     Herpes I /II IgM Ab Indriect  -     Herpes I/II IgG Antibodies  -     Trichomonas Vaginalis, RYAN    Patient instructed to call if vaginal symptoms do return  Safe sex practices reviewed  Also educated on HSV antibody testing  Other orders  -     clindamycin-benzoyl peroxide (BENZACLIN) gel; APPLY TO AFFECTED AREA TWICE A DAY IN THE MORNING AND IN THE EVENING  -     Cancel: POCT wet mount        All questions answered, return for annual         Pleasant 34 y o  here for vaginal complaints STD exposure concerns  She recently had some vaginal itching for about 2 days and then it stopped  This occurred after having unprotected intercourse with a new partner  She denies any vulvar or vaginal itching or irritation today, denies F/C/N/V,  recent antibiotic use  Denies douching  Denies  pelvic pain or dyspareunia  She is requesting ALL STD testing, reviewed checking with her insurance company first for STD blood work coverage, she requests also HSV testing and Trichomonas testing since she was having symptoms        Past Medical History:   Diagnosis Date    Anxiety     Depression     No known health problems      Past Surgical History:   Procedure Laterality Date    NO PAST SURGERIES       Social History     Tobacco Use    Smoking status: Former Smoker     Last attempt to quit: 2016     Years since quitting: 3 9    Smokeless tobacco: Never Used   Substance Use Topics    Alcohol use: Yes     Comment: socially     Drug use: Yes     Types: Marijuana     Family History   Problem Relation Age of Onset    Ovarian cancer Maternal Grandmother     Uterine cancer Maternal Grandmother     Cervical cancer Maternal Grandmother     No Known Problems Mother    Vernon Ugarte Clotting disorder Father     No Known Problems Sister     Breast cancer Neg Hx     Colon cancer Neg Hx        Current Outpatient Medications:     ALPRAZolam (XANAX) 0 25 mg tablet, , Disp: , Rfl:     clindamycin-benzoyl peroxide (BENZACLIN) gel, APPLY TO AFFECTED AREA TWICE A DAY IN THE MORNING AND IN THE EVENING, Disp: , Rfl: 2    clotrimazole (LOTRIMIN) 1 % cream, Apply topically 2 (two) times a day for 14 days, Disp: 30 g, Rfl: 0    escitalopram (LEXAPRO) 5 mg tablet, , Disp: , Rfl:     methylphenidate (CONCERTA) 18 mg ER tablet, Take 18 mg by mouth every morning, Disp: , Rfl: 0    APRI 0 15-30 MG-MCG per tablet, TAKE 1 TABLET BY MOUTH EVERY DAY (Patient not taking: Reported on 2019), Disp: 84 tablet, Rfl: 1    nystatin (MYCOSTATIN) cream, Apply topically 2 (two) times a day for 7 days To external labia, Disp: 30 g, Rfl: 1    No Known Allergies  OB History    Para Term  AB Living   1 0     1 0   SAB TAB Ectopic Multiple Live Births     1     0      # Outcome Date GA Lbr Kaden/2nd Weight Sex Delivery Anes PTL Lv   1 TAB                Vitals:    19 0851   BP: 112/64   BP Location: Right arm   Patient Position: Sitting   Weight: 70 8 kg (156 lb)   Height: 5' 3" (1 6 m)     Body mass index is 27 63 kg/m²  Review of Systems   Constitutional: Negative for chills, fatigue, fever and unexpected weight change  Respiratory: Negative for shortness of breath  Gastrointestinal: Negative for anal bleeding, blood in stool, constipation and diarrhea  Genitourinary: Negative for difficulty urinating, dysuria and hematuria  Physical Exam   Constitutional: She appears well-developed and well-nourished  No distress  Alert and oriented  HENT: atraumatic  Head: Normocephalic  Neck: Normal range of motion  Neck supple  Pulmonary: Effort normal   Abdominal: Soft  Pelvic exam was performed with patient supine  No labial fusion   There is no rash, tenderness, lesion or injury on the right labia  There is no rash, tenderness, lesion or injury on the left labia  Urethral meatus does not show any tenderness, inflammation or discharge  Palpation of midline bladder without pain or discomfort  Uterus is not deviated, not enlarged, not fixed and not tender  Cervix exhibits no motion tenderness, no discharge and no friability  Right adnexum displays no mass, no tenderness and no fullness  Left adnexum displays no mass, no tenderness and no fullness  No erythema or tenderness in the vagina  No foreign body in the vagina  No signs of injury around the vagina  Small amount of thick white vaginal discharge found  Perineum and anus without areas of injury  No lesions noted or swelling  Lymphadenopathy:        Right: No inguinal adenopathy present  Left: No inguinal adenopathy present

## 2019-11-25 NOTE — PATIENT INSTRUCTIONS
Vaginitis   WHAT YOU NEED TO KNOW:   What is vaginitis? Vaginitis is an inflammation or infection of the vagina  What causes vaginitis? Vaginitis is usually caused by bacteria, a virus, or a fungus  The chemicals in bubble baths, soaps, and perfumes can also cause vaginitis  A foreign body inside the vagina can also cause vaginitis  The infection may spread through sexual activity  It can also pass to a baby during birth  What increases my risk for vaginitis? · Diabetes mellitus that is not controlled    · Antibiotics, such as those used to treat fungal vaginitis     · Weakened immune system    · High estrogen levels, such as during pregnancy or from birth control pills    · Smoking    · New or multiple sex partners     · Sexual abuse    · Incorrect care of vagina, such as not wiping from front to back    · Use of spermicide or douche  What are the signs and symptoms of vaginitis? · Tenderness, itching, redness, and swelling     · Foul-smelling odor     · Thick, curd-like discharge     · Thin, gray-white discharge    · Small skin tears or chafing    · Painful sexual intercourse    · Pain when you urinate  How is vaginitis diagnosed? Your healthcare provider will ask about your signs and symptoms and examine you  A sample of discharge from your vagina will be tested for infection  How is vaginitis treated? · Antifungals  are used to treat a fungal infection  They may be given as a cream, gel, or tablet you insert into your vagina  · Antibiotics  are used to fight an infection caused by bacteria  What are the risks of vaginitis? Your infection may return  Left untreated, the bacteria or virus can spread  This can damage organs, such as your fallopian tubes  The infection can spread to your sexual partner if you do not have safe sex  The infection may lead to pregnancy complications, such as  birth or pelvic inflammatory disease  How can I manage my vaginitis?    · Wash your vagina  with mild soap and warm water each day  Gently dry the area after washing  · Do not douche  or insert other irritating products into your vagina  · Do not wear  tight-fitting clothes or undergarments  These can make your symptoms worse  · Do not have sex until your symptoms go away  When you have sex, always use a condom  Condoms can help protect you from contact with fluids from your partner that may be causing your vaginitis  How can I prevent vaginitis? · Wipe from front to back  after you urinate  · Do not use irritating products such as bubble baths or perfumed soaps  When should I contact my healthcare provider? · You have a fever  · You have abdominal pain  · Your symptoms get worse, even after treatment  · Your symptoms return  · You have questions or concerns about your condition or care  When should I seek immediate care? · You have unusual vaginal bleeding  · You have severe abdominal pain  CARE AGREEMENT:   You have the right to help plan your care  Learn about your health condition and how it may be treated  Discuss treatment options with your caregivers to decide what care you want to receive  You always have the right to refuse treatment  The above information is an  only  It is not intended as medical advice for individual conditions or treatments  Talk to your doctor, nurse or pharmacist before following any medical regimen to see if it is safe and effective for you  © 2017 [address] Information is for End User's use only and may not be sold, redistributed or otherwise used for commercial purposes  All illustrations and images included in CareNotes® are the copyrighted property of A D [employer] M [employer]  or [employer]

## 2019-11-26 LAB
C TRACH DNA SPEC QL NAA+PROBE: NEGATIVE
N GONORRHOEA DNA SPEC QL NAA+PROBE: NEGATIVE

## 2019-11-28 LAB — T VAGINALIS RRNA SPEC QL NAA+PROBE: NEGATIVE

## 2019-12-20 DIAGNOSIS — Z30.41 ENCOUNTER FOR SURVEILLANCE OF CONTRACEPTIVE PILLS: ICD-10-CM

## 2019-12-20 RX ORDER — LEVONORGESTREL AND ETHINYL ESTRADIOL 0.1-0.02MG
KIT ORAL
Qty: 84 TABLET | Refills: 1 | Status: SHIPPED | OUTPATIENT
Start: 2019-12-20 | End: 2020-08-25 | Stop reason: ALTCHOICE

## 2020-08-25 ENCOUNTER — ANNUAL EXAM (OUTPATIENT)
Dept: OBGYN CLINIC | Age: 30
End: 2020-08-25
Payer: COMMERCIAL

## 2020-08-25 VITALS
SYSTOLIC BLOOD PRESSURE: 116 MMHG | WEIGHT: 181 LBS | BODY MASS INDEX: 32.07 KG/M2 | DIASTOLIC BLOOD PRESSURE: 60 MMHG | HEIGHT: 63 IN

## 2020-08-25 DIAGNOSIS — R10.2 VAGINAL PAIN: ICD-10-CM

## 2020-08-25 DIAGNOSIS — N89.8 VAGINAL ITCHING: ICD-10-CM

## 2020-08-25 DIAGNOSIS — Z01.411 ENCOUNTER FOR GYNECOLOGICAL EXAMINATION WITH ABNORMAL FINDING: Primary | ICD-10-CM

## 2020-08-25 DIAGNOSIS — Z30.015 ENCOUNTER FOR INITIAL PRESCRIPTION OF VAGINAL RING HORMONAL CONTRACEPTIVE: ICD-10-CM

## 2020-08-25 PROCEDURE — G0145 SCR C/V CYTO,THINLAYER,RESCR: HCPCS | Performed by: PATHOLOGY

## 2020-08-25 PROCEDURE — G0124 SCREEN C/V THIN LAYER BY MD: HCPCS | Performed by: PATHOLOGY

## 2020-08-25 PROCEDURE — S0612 ANNUAL GYNECOLOGICAL EXAMINA: HCPCS | Performed by: NURSE PRACTITIONER

## 2020-08-25 RX ORDER — FLUCONAZOLE 150 MG/1
150 TABLET ORAL ONCE
Qty: 2 TABLET | Refills: 0 | Status: SHIPPED | OUTPATIENT
Start: 2020-08-25 | End: 2020-08-25

## 2020-08-25 RX ORDER — ETONOGESTREL AND ETHINYL ESTRADIOL 11.7; 2.7 MG/1; MG/1
INSERT, EXTENDED RELEASE VAGINAL
Qty: 1 EACH | Refills: 2 | Status: SHIPPED | OUTPATIENT
Start: 2020-08-25 | End: 2021-10-28 | Stop reason: ALTCHOICE

## 2020-08-25 NOTE — PROGRESS NOTES
Diagnoses and all orders for this visit:    Encounter for gynecological examination with abnormal finding  -     Liquid-based pap, screening    Vaginal pain  -     US pelvis complete w transvaginal; Future    Vaginal itching  -     fluconazole (DIFLUCAN) 150 mg tablet; Take 1 tablet (150 mg total) by mouth once for 1 dose Once and repeat in 3 days    Encounter for initial prescription of vaginal ring hormonal contraceptive  -     etonogestrel-ethinyl estradiol (NUVARING) 0 12-0 015 MG/24HR vaginal ring; Insert vaginally and leave in place for 3 consecutive weeks, then remove for 1 week  Calcium/vit d inclusion in the diet discussed, call with any issues, SBE reinforced, all concerns addressed  Pleasant 31 y o  premenopausal female here for annual exam  She denies any issues with bleeding or her menses  Reports regular cycles and would like to try NR since she did not do well with ocp  Denies history of abnormal pap smears  Last Pap  NIL, pap today  Denies vaginal issues other than occasional itching, she has a hx of frequent yeast  + chronic vaginal/pelvic pain, agrees it may be related to her constipation which she just started probiotics for  Sexually active without any concerns  Denies a new partner since last STD check in 2019      Past Medical History:   Diagnosis Date    Anxiety     Depression     No known health problems      Past Surgical History:   Procedure Laterality Date    NO PAST SURGERIES       Family History   Problem Relation Age of Onset    Ovarian cancer Maternal Grandmother     Uterine cancer Maternal Grandmother     Cervical cancer Maternal Grandmother     No Known Problems Mother     Clotting disorder Father     No Known Problems Sister     Breast cancer Neg Hx     Colon cancer Neg Hx      Social History     Tobacco Use    Smoking status: Former Smoker     Last attempt to quit: 2016     Years since quittin 6    Smokeless tobacco: Never Used   Substance Use Topics    Alcohol use: Yes     Frequency: Monthly or less    Drug use: Yes     Types: Marijuana       Current Outpatient Medications:     ALPRAZolam (XANAX) 0 25 mg tablet, , Disp: , Rfl:     clindamycin-benzoyl peroxide (BENZACLIN) gel, APPLY TO AFFECTED AREA TWICE A DAY IN THE MORNING AND IN THE EVENING, Disp: , Rfl: 2    clotrimazole (LOTRIMIN) 1 % cream, Apply topically 2 (two) times a day for 14 days, Disp: 30 g, Rfl: 0    escitalopram (LEXAPRO) 5 mg tablet, , Disp: , Rfl:     etonogestrel-ethinyl estradiol (NUVARING) 0 12-0 015 MG/24HR vaginal ring, Insert vaginally and leave in place for 3 consecutive weeks, then remove for 1 week , Disp: 1 each, Rfl: 2    fluconazole (DIFLUCAN) 150 mg tablet, Take 1 tablet (150 mg total) by mouth once for 1 dose Once and repeat in 3 days, Disp: 2 tablet, Rfl: 0    methylphenidate (CONCERTA) 18 mg ER tablet, Take 18 mg by mouth every morning, Disp: , Rfl: 0    nystatin (MYCOSTATIN) cream, Apply topically 2 (two) times a day for 7 days To external labia, Disp: 30 g, Rfl: 1  Patient Active Problem List    Diagnosis Date Noted    Acute vaginitis 2019    Screen for STD (sexually transmitted disease) 2019    Vaginal itching 2019    Screening for STD (sexually transmitted disease) 2019    Chronic vaginitis 2019    Candida infection of genital region 2019    Encounter for gynecological examination with abnormal finding 2019       No Known Allergies    OB History    Para Term  AB Living   1 0     1 0   SAB TAB Ectopic Multiple Live Births     1     0      # Outcome Date GA Lbr Kaden/2nd Weight Sex Delivery Anes PTL Lv   1 TAB              SW for [address] :    20 1614   BP: 116/60   BP Location: Right arm   Patient Position: Sitting   Weight: 82 1 kg (181 lb)   Height: 5' 3" (1 6 m)     Body mass index is 32 06 kg/m²      Review of Systems   Constitutional: Negative for chills, fatigue, fever and unexpected weight change  Respiratory: Negative for shortness of breath  Gastrointestinal: Negative for anal bleeding, blood in stool, constipation and diarrhea  Genitourinary: Negative for difficulty urinating, dysuria and hematuria  Physical Exam   Constitutional: She appears well-developed and well-nourished  No distress  HENT:   Head: Normocephalic  Neck: Normal range of motion  Neck supple  Pulmonary: Effort normal   Breasts: bilateral without masses, skin changes or nipple discharge  Bilaterally soft and warm to touch  No areas of erythema or pain  Abdominal: Soft  Pelvic exam was performed with patient supine  No labial fusion  There is no rash, tenderness, lesion or injury on the right labia  There is no rash, tenderness, lesion or injury on the left labia  Urethral meatus does not show any tenderness, inflammation or discharge  Palpation of midline bladder without pain or discomfort  Uterus is not deviated, not enlarged, not fixed and not tender  Cervix exhibits no motion tenderness, no discharge and no friability  Right adnexum displays no mass, no tenderness and no fullness  Left adnexum displays no mass, no tenderness and no fullness  No erythema or tenderness in the vagina  No foreign body in the vagina  No signs of injury around the vagina  No vaginal discharge found  No signs of injury around the vagina or anus  Perineum without lesions, signs of injury, erythema or swelling  Lymphadenopathy:        Right: No inguinal adenopathy present  Left: No inguinal adenopathy present

## 2020-08-25 NOTE — PATIENT INSTRUCTIONS
Etonogestrel/Ethinyl Estradiol (Into the vagina)   Ethinyl Estradiol (ETH-i-nil es-tra-DYE-ol), Etonogestrel (e-toe-aviva-AUGUSTUS-trel)  Prevents pregnancy  Brand Name(s): NuvaRing   There may be other brand names for this medicine  When This Medicine Should Not Be Used: This medicine is not right for everyone  Do not use it if you had an allergic reaction to etonogestrel or ethinyl estradiol, if you are pregnant, or if you have vaginal bleeding that has not been checked by a doctor, liver disease or tumors, breast cancer, problems with blood clots, or certain heart problems  How to Use This Medicine: Insert  · This medicine is in a ring that is put into your vagina  Your doctor or nurse will show you how to put in the ring  The ring is left in place for 3 weeks then removed and another inserted 1 week later  During the week without the ring, you will usually have your menstrual period  Follow directions about how to use the ring, and ask your doctor if you have questions  · The first time you start using the ring, you should also use a second form of birth control during the first 7 days to avoid pregnancy  Do not use a diaphragm, because the ring may affect how the diaphragm fits  · Read and follow the patient instructions that come with this medicine  Talk to your doctor or pharmacist if you have any questions  · Missed dose:   ¨ If NuvaRing® has slipped out and it has been out for less than 3 hours, rinse it in cool or lukewarm water and reinsert it  You should still be protected from pregnancy  If Harman Phoenix has been out for more than 3 hours, insert a new ring  You must use an extra method of birth control until the Harman Phoenix has been in place for 7 days in a row  Do not use a diaphragm  ¨ If you leave the vaginal ring in place for more than 4 weeks, you may not be protected from pregnancy  Make sure that you are not pregnant before you insert a new ring   You must use an additional form of birth control (not a diaphragm) until the new ring has been in place for 7 days in a row  ¨ If you forget to insert a new ring after the ring-free week, call your doctor for instructions  · Store the medicine in a closed container at room temperature, away from heat, moisture, and direct light  Place the used NuvaRing® in the re-sealable foil pouch and put it in the trash where children and pets cannot get to it  Do not flush the ring down the toilet  Drugs and Foods to Avoid:   Ask your doctor or pharmacist before using any other medicine, including over-the-counter medicines, vitamins, and herbal products  · Some foods and medicines can affect how this medicine works  Tell your doctor if you are also using Dallin's wort, acetaminophen, ascorbic acid, aprepitant, atorvastatin, boceprevir, bosentan, carbamazepine, clofibric acid, cyclosporine, felbamate, griseofulvin, lamotrigine, morphine, oxcarbazepine, phenobarbital, phenytoin, prednisolone, rifabutin, rifampicin, rufinamide, salicylic acid, telaprevir, temazepam, theophylline, tizanidine, or topiramate  · Also tell your doctor if you are using medicine to treat an infection (such as fluconazole, itraconazole, ketoconazole, miconazole, voriconazole), medicine to treat HIV, or thyroid medicines  · Do not eat grapefruit or drink grapefruit juice while you are using this medicine  Warnings While Using This Medicine:   · It is not safe to take this medicine during pregnancy  It could harm an unborn baby  Tell your doctor right away if you become pregnant  · Tell your doctor if you are breastfeeding, or if you recently had a baby, miscarriage, or   Tell your doctor if you have kidney disease, cervical cancer, diabetes, epilepsy, migraines, heart or blood vessel disease, high cholesterol, or high blood pressure  Also tell your doctor if you have a history of depression, chloasma gravidarum, jaundice during pregnancy, or toxic shock syndrome, or if you smoke    · This medicine may cause the following problems:  ¨ Higher risk of heart attack, stroke, or blood clots  ¨ Liver problems  ¨ High blood pressure  ¨ Gallbladder disease  ¨ Possible increased risk of breast or cervical cancer  · Tell any doctor who treats you that you are using this medicine  You may need to stop using this medicine before and after you have surgery, because of the risk of blood clots  · This medicine will not protect you from HIV/AIDS or other sexually transmitted infections  · Tell any doctor or dentist who treats you that you are using this medicine  This medicine may affect certain medical test results  · Your doctor will check the effects of this medicine at regular visits  Keep all appointments  · Keep all medicine out of the reach of children  Never share your medicine with anyone  Possible Side Effects While Using This Medicine:   Call your doctor right away if you notice any of these side effects:  · Allergic reaction: Itching or hives, swelling in your face or hands, swelling or tingling in your mouth or throat, chest tightness, trouble breathing  · Chest pain that may spread, trouble breathing, unusual sweating, fainting  · Dark urine or pale stools, nausea, vomiting, loss of appetite, stomach pain, yellow skin or eyes  · Fast, slow, or pounding heartbeat  · Numbness or weakness on one side of your body, pain in your lower leg, sudden or severe headache, problems with vision, speech, or walking  · Redness, pain, itching, or burning sensation inside your vagina  · Sudden high fever, diarrhea, dizziness, fainting, muscle aches, or a sunburn-like rash  · Unusual or unexpected vaginal bleeding or heavy bleeding  · Vision loss, double vision  If you notice these less serious side effects, talk with your doctor:   · Breast tenderness or swelling  · Depression, mood changes  If you notice other side effects that you think are caused by this medicine, tell your doctor     Call your doctor for medical advice about side effects  You may report side effects to FDA at 6-166-FDA-6377  © 2017 [employer] Information is for End User's use only and may not be sold, redistributed or otherwise used for commercial purposes  The above information is an  only  It is not intended as medical advice for individual conditions or treatments  Talk to your doctor, nurse or pharmacist before following any medical regimen to see if it is safe and effective for you

## 2020-09-02 LAB
LAB AP GYN PRIMARY INTERPRETATION: ABNORMAL
Lab: ABNORMAL
PATH INTERP SPEC-IMP: ABNORMAL

## 2020-09-04 ENCOUNTER — TELEPHONE (OUTPATIENT)
Dept: OBGYN CLINIC | Facility: CLINIC | Age: 30
End: 2020-09-04

## 2020-09-04 NOTE — TELEPHONE ENCOUNTER
----- Message from Leonora Solis, [address] sent at 9/3/2020  3:28 PM EDT -----  Please arrange for colposcopy, pap was LSIL  Thanks

## 2020-09-10 ENCOUNTER — TELEPHONE (OUTPATIENT)
Dept: OBGYN CLINIC | Facility: CLINIC | Age: 30
End: 2020-09-10

## 2020-09-10 NOTE — TELEPHONE ENCOUNTER
Pt returned call- reviewed pap results & need for colpo- explained procedure to pt- appt scheduled for 10/7/20 at [facility] with AL

## 2020-09-10 NOTE — TELEPHONE ENCOUNTER
CHIEF COMPLAINT  Mr. De Luna states that he has neck and shoulder pain that started in 2015. He states that he was diagnosed with schwannomata in 2014. He states that he currently has a tumors on the left side of his neck,both shoulder, both armpits. He states that he has had increased pain in his right shoulder that is a constant dull ache that radiates down his right arm to his elbow for the last 5 weeks.  He also gets occasional shooting pains down to his right elbow that radiates from his right armpit that is sometime hypersensitive. He gets burning and shooting pain in the left side of his neck that radiates to the left side of his scalp. He states that he goes to Neurologist Dr. Lencho Go and Neurosurgeon Dr. Laith Harding for his schwannomata.    Subjective   Isma De Luna is a 49 y.o. male.   He presents to the office for evaluation of chronic pain. He was referred here by Dr. Bonnie Benoit.         Neck Pain    This is a chronic problem. The current episode started more than 1 month ago. The problem occurs constantly. The problem has been gradually worsening. Associated with: NF. The pain is present in the midline and right side. The quality of the pain is described as aching, cramping and shooting. The pain is moderate. Associated symptoms include numbness. Pertinent negatives include no weakness. He has tried acetaminophen, bed rest, heat, ice, NSAIDs, oral narcotics, muscle relaxants, neck support and home exercises for the symptoms. The treatment provided mild relief.        PEG Assessment   What number best describes your pain on average in the past week?4  What number best describes how, during the past week, pain has interfered with your enjoyment of life?2  What number best describes how, during the past week, pain has interfered with your general activity?  2        Current Outpatient Prescriptions:   •  allopurinol (ZYLOPRIM) 100 MG tablet, TAKE 1 TABLET BY MOUTH EVERY DAY, Disp: 90 tablet, Rfl: 1  •  Pt called back and has some questions about colpo and results  Best call back [phone]     Thank you!  B Complex-Biotin-FA (HM VITAMIN B100 COMPLEX PO), Take 1 tablet by mouth daily., Disp: , Rfl:   •  carbonyl iron (FEOSOL) 45 MG tablet tablet, Take  by mouth daily., Disp: , Rfl:   •  Cholecalciferol (VITAMIN D3) 600 UNITS capsule capsule, Take 600 Units by mouth daily., Disp: , Rfl:   •  Cyanocobalamin 1000 MCG sublingual tablet, Place 1 tablet under the tongue daily., Disp: , Rfl:   •  cyclobenzaprine (FLEXERIL) 10 MG tablet, Take 1 tablet by mouth 3 (Three) Times a Day As Needed for Muscle Spasms., Disp: 90 tablet, Rfl: 4  •  DULoxetine (CYMBALTA) 30 MG capsule, Take 1 capsule by mouth 2 (Two) Times a Day., Disp: 60 capsule, Rfl: 11  •  fexofenadine (ALLEGRA) 180 MG tablet, Take 180 mg by mouth daily., Disp: , Rfl:   •  gabapentin (NEURONTIN) 800 MG tablet, Take 1 tablet by mouth 3 (Three) Times a Day., Disp: 90 tablet, Rfl: 5  •  HYDROcodone-acetaminophen (NORCO)  MG per tablet, Take 1 tablet by mouth Every 6 (Six) Hours As Needed for Moderate Pain ., Disp: 120 tablet, Rfl: 0  •  losartan (COZAAR) 100 MG tablet, TAKE 1 TABLET BY MOUTH DAILY, Disp: 90 tablet, Rfl: 1  •  Multiple Vitamin (MULTI-VITAMIN DAILY PO), Take  by mouth., Disp: , Rfl:   •  omeprazole (priLOSEC) 40 MG capsule, TAKE 1 CAPSULE BY MOUTH DAILY, Disp: 90 capsule, Rfl: 1  •  sildenafil (REVATIO) 20 MG tablet, Take 1 tablet by mouth Daily., Disp: 90 tablet, Rfl: 3    The following portions of the patient's history were reviewed and updated as appropriate: allergies, current medications, past family history, past medical history, past social history, past surgical history and problem list.      REVIEW OF PERTINENT MEDICAL DATA                                          Review of office encounter 10/30/2017 with Dr. Pagan at [facility]          Review of Systems   Constitutional: Negative for fatigue.   HENT: Positive for congestion.    Eyes: Negative for visual disturbance.   Respiratory: Negative for cough, shortness of  "breath and wheezing.    Cardiovascular: Negative.    Gastrointestinal: Positive for constipation. Negative for diarrhea.   Genitourinary: Negative for difficulty urinating.   Musculoskeletal: Positive for arthralgias (bilateral shoulders), back pain and neck pain.   Neurological: Positive for numbness. Negative for weakness.   Psychiatric/Behavioral: Negative for sleep disturbance and suicidal ideas. The patient is not nervous/anxious.          Vitals:    11/06/17 1011   BP: 149/90   Pulse: 87   Resp: 18   Temp: 98.1 °F (36.7 °C)   SpO2: 98%   Weight: 246 lb 3.2 oz (112 kg)   Height: 68\" (172.7 cm)   PainSc:   2   PainLoc: Shoulder         Objective   Physical Exam   Constitutional: He is oriented to person, place, and time. Vital signs are normal. He appears well-developed and well-nourished.  Non-toxic appearance. No distress.   HENT:   Head: Normocephalic and atraumatic.   Right Ear: Hearing and external ear normal.   Left Ear: Hearing and external ear normal.   Nose: Nose normal.   Eyes: Conjunctivae and lids are normal. Pupils are equal, round, and reactive to light.   Pulmonary/Chest: Effort normal. No respiratory distress.   Abdominal: Normal appearance.   Neurological: He is alert and oriented to person, place, and time. No cranial nerve deficit.   Psychiatric: He has a normal mood and affect. His behavior is normal.   Nursing note and vitals reviewed.      Assessment/Plan   Isma was seen today for neck pain and shoulder pain.    Diagnoses and all orders for this visit:    Chronic pain due to neoplasm  -     Urine Drug Screen Confirmation - Urine, Urine, Clean Catch  -     POC Urine Drug Screen, Triage    Neurofibromatosis    Neuropathic pain, arm    Brachial plexus neuropathy  -     HYDROcodone-acetaminophen (NORCO)  MG per tablet; Take 1 tablet by mouth Every 6 (Six) Hours As Needed for Moderate Pain .    Other orders  -     gabapentin (NEURONTIN) 800 MG tablet; Take 1 tablet by mouth 3 (Three) Times " a Day.  -     DULoxetine (CYMBALTA) 30 MG capsule; Take 1 capsule by mouth 2 (Two) Times a Day.  -     SCANNED - LABS        --- Follow-up 1 month  -- assume monitoring for Hydrocodone/apap, Gabapentin, Cymbalta.    A medication management agreement is signed by the patient and the provider today.  Reviewed with the patient that this contract is specific to controlled substances, specifically pain medication.  Reviewed core of pain medicine is to decrease pain and increase functional level.  Reviewed the medication must be picked up as a written prescription from this office and will not be called into any pharmacy.  Reviewed the use of Semaj within the office setting.  Reviewed causes for potential of discontinuation of opiates,  including but not limited to consideration for diversion, obtaining other controlled substances from other license practitioners, or  inappropriate office behavior.  Patient understands to use a controlled substance as prescribed by physician and to avoid improper use of controlled substances.  Patient will also verbalized understanding that prescriptions to be filled at the same pharmacy  unless office staff is made aware of this.  Patient understands they can be submitted to random urine drug screens and/or random pill counts on any request.  Patient understands they are not to receive early refills.  The patient must produce an official police report for any effort to replace controlled substances that are lost or stolen.  No use of illegal street drugs while receiving controlled substances from this prescribing provider.  The patient is to take medication as prescribed  and not deviate from the normal schedule without consultation from the provider.  Patient is not to share the medication with others or to take medication with alcohol or other sedatives.  Use caution when driving or operating machinery.  Alert office if the patient becomes pregnant or begins nursing a child.  Reviewed  the use of controlled substances recreates a risk for respiratory depression, which may result in serious harm or even death.  Must always keep the prescription in the original container.  Patient is to store controlled substances in a locked cabinet or other secure storage unit that is cool, dry and out of sunlight.  Patient must immediately notify office if any controlled substances is stolen or improperly taken by another individual.  Reviewed risk for physical dependence, tolerance and addiction.    -- UDS per routine  -- we could perform a cervical epidural steroid injection if needed / requested.          BIRGIT REPORT    As part of the patient's treatment plan, I am prescribing controlled substances. The patient has been made aware of appropriate use of such medications, including potential risk of somnolence, limited ability to drive and/or work safely, and the potential for dependence or overdose. It has also bee made clear that these medications are for use by this patient only, without concomitant use of alcohol or other substances unless prescribed.     Patient has completed prescribing agreement detailing terms of continued prescribing of controlled substances, including monitoring BIRGIT reports, urine drug screening, and pill counts if necessary. The patient is aware that inappropriate use will results in cessation of prescribing such medications.    BIRGIT report has been reviewed and scanned into the patient's chart.    Date of last BIRGIT : 11/3/2017    History and physical exam exhibit continued safe and appropriate use of controlled substances.         EMR Dragon/Transcription disclaimer:   Much of this encounter note is an electronic transcription/translation of spoken language to printed text. The electronic translation of spoken language may permit erroneous, or at times, nonsensical words or phrases to be inadvertently transcribed; Although I have reviewed the note for such errors, some may  still exist.

## 2020-09-10 NOTE — TELEPHONE ENCOUNTER
Pt returned call- reviewed pap results & colpo  procedure with pt   appt scheduled with AL, 10/7/2020 for colposcopy  Advised pt to take motrin prior to appt  Pt verbalized understanding

## 2020-10-19 ENCOUNTER — LAB (OUTPATIENT)
Dept: LAB | Facility: HOSPITAL | Age: 30
End: 2020-10-19
Attending: INTERNAL MEDICINE
Payer: COMMERCIAL

## 2020-10-19 ENCOUNTER — PROCEDURE VISIT (OUTPATIENT)
Dept: OBGYN CLINIC | Facility: CLINIC | Age: 30
End: 2020-10-19
Payer: COMMERCIAL

## 2020-10-19 ENCOUNTER — TRANSCRIBE ORDERS (OUTPATIENT)
Dept: ADMINISTRATIVE | Facility: HOSPITAL | Age: 30
End: 2020-10-19

## 2020-10-19 VITALS
SYSTOLIC BLOOD PRESSURE: 116 MMHG | WEIGHT: 184 LBS | DIASTOLIC BLOOD PRESSURE: 62 MMHG | BODY MASS INDEX: 32.6 KG/M2 | HEIGHT: 63 IN

## 2020-10-19 DIAGNOSIS — R87.612 LGSIL ON PAP SMEAR OF CERVIX: Primary | ICD-10-CM

## 2020-10-19 DIAGNOSIS — B39.0 ACUTE PULMONARY HISTOPLASMOSIS (HCC): Primary | ICD-10-CM

## 2020-10-19 PROBLEM — Z11.3 SCREEN FOR STD (SEXUALLY TRANSMITTED DISEASE): Status: RESOLVED | Noted: 2019-11-25 | Resolved: 2020-10-19

## 2020-10-19 PROBLEM — Z11.3 SCREENING FOR STD (SEXUALLY TRANSMITTED DISEASE): Status: RESOLVED | Noted: 2019-09-09 | Resolved: 2020-10-19

## 2020-10-19 LAB
T4 FREE SERPL-MCNC: 1.17 NG/DL (ref 0.76–1.46)
TSH SERPL DL<=0.05 MIU/L-ACNC: 0.5 UIU/ML (ref 0.36–3.74)

## 2020-10-19 PROCEDURE — 88305 TISSUE EXAM BY PATHOLOGIST: CPT | Performed by: PATHOLOGY

## 2020-10-19 PROCEDURE — 84439 ASSAY OF FREE THYROXINE: CPT

## 2020-10-19 PROCEDURE — 36415 COLL VENOUS BLD VENIPUNCTURE: CPT

## 2020-10-19 PROCEDURE — 84443 ASSAY THYROID STIM HORMONE: CPT

## 2020-10-19 PROCEDURE — 57454 BX/CURETT OF CERVIX W/SCOPE: CPT | Performed by: NURSE PRACTITIONER

## 2020-10-20 ENCOUNTER — TRANSCRIBE ORDERS (OUTPATIENT)
Dept: ADMINISTRATIVE | Facility: HOSPITAL | Age: 30
End: 2020-10-20

## 2020-10-20 DIAGNOSIS — E04.9 ENLARGEMENT OF THYROID: Primary | ICD-10-CM

## 2020-10-26 ENCOUNTER — HOSPITAL ENCOUNTER (OUTPATIENT)
Dept: ULTRASOUND IMAGING | Facility: CLINIC | Age: 30
Discharge: HOME | End: 2020-10-26
Payer: COMMERCIAL

## 2020-10-26 DIAGNOSIS — E04.9 ENLARGEMENT OF THYROID: ICD-10-CM

## 2020-10-26 PROCEDURE — 76536 US EXAM OF HEAD AND NECK: CPT

## 2021-01-29 DIAGNOSIS — Z23 ENCOUNTER FOR IMMUNIZATION: ICD-10-CM

## 2021-02-08 ENCOUNTER — IMMUNIZATIONS (OUTPATIENT)
Dept: FAMILY MEDICINE CLINIC | Facility: HOSPITAL | Age: 31
End: 2021-02-08

## 2021-02-08 DIAGNOSIS — Z23 ENCOUNTER FOR IMMUNIZATION: Primary | ICD-10-CM

## 2021-02-08 PROCEDURE — 0011A SARS-COV-2 / COVID-19 MRNA VACCINE (MODERNA) 100 MCG: CPT

## 2021-02-08 PROCEDURE — 91301 SARS-COV-2 / COVID-19 MRNA VACCINE (MODERNA) 100 MCG: CPT

## 2021-03-13 ENCOUNTER — IMMUNIZATIONS (OUTPATIENT)
Dept: FAMILY MEDICINE CLINIC | Facility: HOSPITAL | Age: 31
End: 2021-03-13

## 2021-03-13 DIAGNOSIS — Z23 ENCOUNTER FOR IMMUNIZATION: Primary | ICD-10-CM

## 2021-03-13 PROCEDURE — 91301 SARS-COV-2 / COVID-19 MRNA VACCINE (MODERNA) 100 MCG: CPT

## 2021-03-13 PROCEDURE — 0012A SARS-COV-2 / COVID-19 MRNA VACCINE (MODERNA) 100 MCG: CPT

## 2021-10-28 ENCOUNTER — ANNUAL EXAM (OUTPATIENT)
Dept: OBGYN CLINIC | Age: 31
End: 2021-10-28
Payer: COMMERCIAL

## 2021-10-28 VITALS
BODY MASS INDEX: 34.73 KG/M2 | SYSTOLIC BLOOD PRESSURE: 118 MMHG | HEIGHT: 63 IN | WEIGHT: 196 LBS | DIASTOLIC BLOOD PRESSURE: 86 MMHG

## 2021-10-28 DIAGNOSIS — B37.49 CANDIDA INFECTION OF GENITAL REGION: ICD-10-CM

## 2021-10-28 DIAGNOSIS — Z01.411 ENCOUNTER FOR GYNECOLOGICAL EXAMINATION WITH ABNORMAL FINDING: Primary | ICD-10-CM

## 2021-10-28 PROBLEM — F90.9 ADHD: Status: ACTIVE | Noted: 2019-02-08

## 2021-10-28 PROBLEM — N76.0 ACUTE VAGINITIS: Status: RESOLVED | Noted: 2019-11-25 | Resolved: 2021-10-28

## 2021-10-28 PROBLEM — N89.8 VAGINAL ITCHING: Status: RESOLVED | Noted: 2019-11-25 | Resolved: 2021-10-28

## 2021-10-28 PROCEDURE — S0612 ANNUAL GYNECOLOGICAL EXAMINA: HCPCS | Performed by: NURSE PRACTITIONER

## 2021-10-28 PROCEDURE — G0145 SCR C/V CYTO,THINLAYER,RESCR: HCPCS | Performed by: NURSE PRACTITIONER

## 2021-10-28 RX ORDER — FLUCONAZOLE 150 MG/1
150 TABLET ORAL ONCE
Qty: 2 TABLET | Refills: 1 | Status: SHIPPED | OUTPATIENT
Start: 2021-10-28 | End: 2021-10-28

## 2021-10-28 RX ORDER — SEMAGLUTIDE 1.34 MG/ML
INJECTION, SOLUTION SUBCUTANEOUS
COMMUNITY

## 2021-11-08 LAB
LAB AP GYN PRIMARY INTERPRETATION: NORMAL
Lab: NORMAL

## 2021-12-23 DIAGNOSIS — B96.89 BV (BACTERIAL VAGINOSIS): Primary | ICD-10-CM

## 2021-12-23 DIAGNOSIS — N76.0 BV (BACTERIAL VAGINOSIS): Primary | ICD-10-CM

## 2021-12-23 RX ORDER — METRONIDAZOLE 7.5 MG/G
GEL VAGINAL
Qty: 70 G | Refills: 0 | Status: SHIPPED | OUTPATIENT
Start: 2021-12-23 | End: 2022-03-11 | Stop reason: SDUPTHER

## 2022-03-11 ENCOUNTER — TELEPHONE (OUTPATIENT)
Dept: OBGYN CLINIC | Facility: CLINIC | Age: 32
End: 2022-03-11

## 2022-03-11 DIAGNOSIS — B96.89 BV (BACTERIAL VAGINOSIS): ICD-10-CM

## 2022-03-11 DIAGNOSIS — N76.0 BV (BACTERIAL VAGINOSIS): ICD-10-CM

## 2022-03-11 RX ORDER — METRONIDAZOLE 7.5 MG/G
GEL VAGINAL
Qty: 70 G | Refills: 0 | Status: SHIPPED | OUTPATIENT
Start: 2022-03-11

## 2022-03-11 NOTE — TELEPHONE ENCOUNTER
L/M for patient on voice mail  Rx sent to pharmacy  Advised patient to call if symptoms not resolved

## 2022-03-11 NOTE — TELEPHONE ENCOUNTER
Patient calling to report vaginal odor/fishy for the past three days  Denies discharge, some irritation  Had this in December  Would like to know if we can order Metrogel  Will route to provider to advise

## 2022-04-15 ENCOUNTER — TELEPHONE (OUTPATIENT)
Dept: OBGYN CLINIC | Facility: CLINIC | Age: 32
End: 2022-04-15

## 2022-04-18 NOTE — PROGRESS NOTES
Diagnoses and all orders for this visit:    Vaginal itching  -     POCT wet mount    Yeast infection  -     fluconazole (DIFLUCAN) 150 mg tablet; Take 1 tablet by mouth today  May repeat in 3 days  Results for orders placed or performed in visit on 22   POCT wet mount   Result Value Ref Range    Yeast, Wet Prep positive     pH 4 5     Whiff Test negative     Clue Cells negative     Trich, Wet Prep negative       Reviewed perineal hygiene and products to avoid  Advised taking a probiotic while on antibiotics  Call if no symptom improvement, all questions answered, return for annual     Subjective    CC: vaginal itching  Jimmy Dunn is a 36 y o  female here for vaginal itching and fishy odor x 1 week  She was treated with metronidazole gel last month for BV  Denies any recent treatment tried for itching  She reports increased clear discharge and occasional vaginal discomfort  Denies douching  Denies dysuria, frequency, urgency, fever, pelvic pain, or dyspareunia  Denies new sexual partners  She has been on doxycycline for the past 3 months for acne control  Patient Active Problem List    Diagnosis Date Noted    Candida infection of genital region 10/28/2021    Chronic vaginitis 2019    ADHD 2019    Depression 2006     Past Medical History:   Diagnosis Date    Anxiety     Depression     No known health problems      Past Surgical History:   Procedure Laterality Date    NO PAST SURGERIES       Social History     Tobacco Use    Smoking status: Former Smoker     Quit date: 2016     Years since quittin 3    Smokeless tobacco: Never Used   Substance Use Topics    Alcohol use:  Yes    Drug use: Not Currently     Types: Marijuana       Current Outpatient Medications:     ALPRAZolam (XANAX) 0 25 mg tablet, , Disp: , Rfl:     DOXYCYCLINE HYCLATE PO, Take by mouth, Disp: , Rfl:     clindamycin-benzoyl peroxide (BENZACLIN) gel, APPLY TO AFFECTED AREA TWICE A DAY IN THE MORNING AND IN THE EVENING, Disp: , Rfl: 2    fluconazole (DIFLUCAN) 150 mg tablet, Take 1 tablet by mouth today  May repeat in 3 days  , Disp: 2 tablet, Rfl: 0    metroNIDAZOLE (METROGEL) 0 75 % vaginal gel, Insert intravaginally QHS x 5 nights, Disp: 70 g, Rfl: 0    Semaglutide,0 25 or 0 5MG/DOS, (Ozempic, 0 25 or 0 5 MG/DOSE,) 2 MG/1 5ML SOPN, Inject under the skin, Disp: , Rfl:     Allergies   Allergen Reactions    Bupropion Anaphylaxis, Hives, Itching and Rash     OB History    Para Term  AB Living   1 0     1 0   SAB IAB Ectopic Multiple Live Births     1     0      # Outcome Date GA Lbr Kaden/2nd Weight Sex Delivery Anes PTL Lv   1 IAB                Vitals:    22 0820   BP: 108/72   BP Location: Right arm   Patient Position: Sitting   Cuff Size: Standard   Weight: 90 7 kg (200 lb)   Height: 5' 3" (1 6 m)     Body mass index is 35 43 kg/m²  Review of Systems   Constitutional: Negative for chills, fatigue, fever and unexpected weight change  Respiratory: Negative for shortness of breath  Gastrointestinal: Negative abdominal pain, constipation and diarrhea  Genitourinary: Negative for difficulty urinating, dysuria and hematuria  Physical Exam   Constitutional: Appears well-developed and well-nourished  No distress  Alert and oriented  HENT: Atraumatic, Normocephalic  Conjunctivae clear  Neck: Normal range of motion  Pulmonary: Effort normal   Abdominal: Soft  Negative for pain, tenderness or mass  Musculoskeletal: Normal ROM  Skin: Warm & Dry  Psychological: Normal mood, thought content, behavior & judgement     Pelvic exam was performed with patient supine, lithotomy position  Labia: Right: Negative rash, tenderness, lesion or injury               Left: Negative rash, tenderness, lesion or injury   Urethral meatus:  Negative for  tenderness, inflammation or discharge  Uterus: not deviated, enlarged, fixed or tender  Cervix: No CMT, no discharge or friability  Right adnexa: no mass, no tenderness and no fullness  Left adnexa: no mass, no tenderness and no fullness  Vagina: No erythema, tenderness, masses, or foreign body in the vagina  No signs of injury around the vagina  Clumpy white discharge noted on vaginal walls  Perineum without lesions, signs of injury, erythema or swelling  Inguinal Canal:        Right: No inguinal adenopathy or hernia present  Left: No inguinal adenopathy or hernia present

## 2022-04-21 ENCOUNTER — OFFICE VISIT (OUTPATIENT)
Dept: OBGYN CLINIC | Age: 32
End: 2022-04-21
Payer: COMMERCIAL

## 2022-04-21 VITALS
BODY MASS INDEX: 35.44 KG/M2 | HEIGHT: 63 IN | WEIGHT: 200 LBS | DIASTOLIC BLOOD PRESSURE: 72 MMHG | SYSTOLIC BLOOD PRESSURE: 108 MMHG

## 2022-04-21 DIAGNOSIS — N89.8 VAGINAL ITCHING: Primary | ICD-10-CM

## 2022-04-21 DIAGNOSIS — B37.9 YEAST INFECTION: ICD-10-CM

## 2022-04-21 LAB
BV WHIFF TEST VAG QL: NEGATIVE
CLUE CELLS SPEC QL WET PREP: NEGATIVE
PH SMN: 4.5 [PH]
T VAGINALIS VAG QL WET PREP: NEGATIVE
YEAST VAG QL WET PREP: POSITIVE

## 2022-04-21 PROCEDURE — 99213 OFFICE O/P EST LOW 20 MIN: CPT

## 2022-04-21 PROCEDURE — 87210 SMEAR WET MOUNT SALINE/INK: CPT

## 2022-04-21 RX ORDER — FLUCONAZOLE 150 MG/1
TABLET ORAL
Qty: 2 TABLET | Refills: 0 | Status: SHIPPED | OUTPATIENT
Start: 2022-04-21 | End: 2022-04-24

## 2022-04-21 NOTE — PATIENT INSTRUCTIONS
Yeast Infection   AMBULATORY CARE:   A yeast infection , or vaginal candidiasis, is a common vaginal infection  A yeast infection is caused by a fungus, or yeast-like germ  Fungi are normally found in your vagina  Too many fungi can cause an infection  Common signs and symptoms:   · Thick, white, cheese-like discharge from your vagina    · Itching, swelling, and redness in your vagina    · Pain or burning when you urinate    · Pain during sexual intercourse    Call your doctor or gynecologist if:   · You have a fever and chills  · You develop abdominal or pelvic pain  · Your discharge is bloody and it is not your monthly period  · Your signs and symptoms get worse, even after treatment  · You have questions or concerns about your condition or care  Treatment for a yeast infection  includes medicines to treat the fungal infection and decrease inflammation  The medicine may be a pill, cream, ointment, or vaginal tablet or suppository  Keep your vagina healthy:   · Clean your genital area with mild soap and warm water each day  Do not get soap inside your vagina  Gently dry the area after washing  Do not use hot tubs  The heat and moisture from hot tubs can increase your risk for another yeast infection  · Always wipe from front to back  after you use the toilet  This prevents spreading bacteria from your rectal area into your vagina  · Do not wear tight-fitting clothes or undergarments  for long periods of time  Wear cotton underwear during the day  Cotton helps keep your genital area dry and does not hold in warmth or moisture  Do not wear underwear at night  · Do not douche  or use feminine hygiene sprays or bubble bath  Do not use pads or tampons that are scented, or colored or perfumed toilet paper  · Do not have sex until your symptoms go away  Have your partner wear a condom until you complete your course of medication      · Ask your healthcare provider about birth control options if necessary  Condoms have latex and diaphragms have gel that kills sperm  Both of these may irritate your genital area  Follow up with your doctor or gynecologist as directed:  Write down your questions so you remember to ask them during your visits  © Copyright [email or web address] 2022 Information is for End User's use only and may not be sold, redistributed or otherwise used for commercial purposes  All illustrations and images included in CareNotes® are the copyrighted property of [email or web address]  or [employer]  The above information is an  only  It is not intended as medical advice for individual conditions or treatments  Talk to your doctor, nurse or pharmacist before following any medical regimen to see if it is safe and effective for you

## 2023-03-08 ENCOUNTER — ANNUAL EXAM (OUTPATIENT)
Dept: OBGYN CLINIC | Age: 33
End: 2023-03-08

## 2023-03-08 VITALS
HEIGHT: 63 IN | DIASTOLIC BLOOD PRESSURE: 76 MMHG | SYSTOLIC BLOOD PRESSURE: 104 MMHG | WEIGHT: 212 LBS | BODY MASS INDEX: 37.56 KG/M2

## 2023-03-08 DIAGNOSIS — Z01.419 ENCOUNTER FOR ANNUAL ROUTINE GYNECOLOGICAL EXAMINATION: Primary | ICD-10-CM

## 2023-03-08 DIAGNOSIS — Z30.09 BIRTH CONTROL COUNSELING: ICD-10-CM

## 2023-03-08 RX ORDER — DOXYCYCLINE 100 MG/1
TABLET ORAL
COMMUNITY
Start: 2023-01-20

## 2023-03-08 NOTE — PROGRESS NOTES
David David  [id number]    Assessment and Plan:  Yearly exam    Encounter for annual routine gynecological examination  ASCCP guidelines reviewed  Pap utd  SBE, daily exercise and healthy diet with adequate calcium and vitamin D encouraged  Weight bearing exercises a minimum of 150 minutes/week advised  Advised to call with any issues, all concerns & questions addressed  See provided information in your after visit summary     Birth control counseling  Patient interested in starting birth control  She has tried numerous OCPs in the past  She does not want something she has to remember to take every day  After discussing all birth control options at length she opted for Nexplanon implant  Written information provided  Diagnoses and all orders for this visit:    Encounter for annual routine gynecological examination    Birth control counseling      F/U Nexplanon insertion      Health Maintenance:    Last PAP: 10/21/2021 Negative  Next PAP Due: 10/2024    Gardasil Vaccine Series: She has had the Gardasil series  Subjective    CC: Yearly Exam     David David is a 29 y o  female  here for an annual exam       Menarche: 14    Patient's last menstrual period was 2023 (exact date)  Sexual activity: She is sexually active without pain, bleeding or dryness  Contraception: condoms - interested in starting birth control  STD testing:  She does not want STD testing today  Non- smoker, rare drinker  Wants to start exercising more regularly  Her menstrual cycles are regular every 28-30 days  She denies any issues with bleeding or her menses  She denies breast concerns, abnormal vaginal discharge, vaginal itching, odor, irritation, bowel/bladder dysfunction, urinary symptoms, pelvic pain, or dyspareunia today       Family hx of breast cancer: No  Family hx of ovarian cancer: Yes MGM  Family hx of colon cancer: No    Past Medical History:   Diagnosis Date   • Anxiety    • Depression    • No known health problems      Past Surgical History:   Procedure Laterality Date   • NO PAST SURGERIES         Immunization History   Administered Date(s) Administered   • COVID-19 MODERNA VACC 0 5 ML IM 2021, 2021       Family History   Problem Relation Age of Onset   • Ovarian cancer Maternal Grandmother    • Uterine cancer Maternal Grandmother    • Cervical cancer Maternal Grandmother    • No Known Problems Mother    • Clotting disorder Father    • No Known Problems Sister    • Breast cancer Neg Hx    • Colon cancer Neg Hx      Social History     Tobacco Use   • Smoking status: Former     Types: Cigarettes     Quit date:      Years since quittin 1   • Smokeless tobacco: Never   Vaping Use   • Vaping Use: Never used   Substance Use Topics   • Alcohol use: Yes   • Drug use: Not Currently     Types: Marijuana       Current Outpatient Medications:   •  ALPRAZolam (XANAX) 0 25 mg tablet, , Disp: , Rfl:   •  doxycycline (ADOXA) 100 MG tablet, , Disp: , Rfl:   •  DOXYCYCLINE HYCLATE PO, Take by mouth, Disp: , Rfl:   Patient Active Problem List    Diagnosis Date Noted   • Encounter for annual routine gynecological examination 2023   • Birth control counseling 2023   • Candida infection of genital region 10/28/2021   • Chronic vaginitis 2019   • ADHD 2019   • Depression 2006       Allergies   Allergen Reactions   • Bupropion Anaphylaxis, Hives, Itching and Rash       OB History    Para Term  AB Living   1 0     1 0   SAB IAB Ectopic Multiple Live Births     1     0      # Outcome Date GA Lbr Kaden/2nd Weight Sex Delivery Anes PTL Lv   1 IAB                Vitals:    23 1645   BP: 104/76   BP Location: Left arm   Patient Position: Sitting   Cuff Size: Large   Weight: 96 2 kg (212 lb)   Height: 5' 3" (1 6 m)     Body mass index is 37 55 kg/m²  Review of Systems   Constitutional: Negative for chills, fatigue, fever and unexpected weight change  Respiratory: Negative for shortness of breath  Cardiovascular: Negative for chest pain and leg swelling  Gastrointestinal: Negative for abdominal pain, constipation, diarrhea, nausea and vomiting  Genitourinary: Negative for difficulty urinating, dyspareunia, dysuria, frequency, genital sores, hematuria, menstrual problem, pelvic pain, urgency, vaginal bleeding, vaginal discharge and vaginal pain  Musculoskeletal: Negative for back pain and myalgias  Skin: Negative for pallor and rash  Neurological: Negative for dizziness, light-headedness and headaches  Hematological: Negative for adenopathy  Psychiatric/Behavioral: Negative for dysphoric mood  Physical Exam  Constitutional:       General: She is not in acute distress  Appearance: Normal appearance  She is not ill-appearing  Genitourinary:      Bladder and urethral meatus normal       No lesions in the vagina  Right Labia: No rash, tenderness, lesions, skin changes or Bartholin's cyst      Left Labia: No tenderness, lesions, skin changes, Bartholin's cyst or rash  No inguinal adenopathy present in the right or left side  No vaginal discharge, erythema, tenderness, bleeding or ulceration  Right Adnexa: not tender, not full and no mass present  Left Adnexa: not tender, not full and no mass present  Cervix is nulliparous  Cervical nabothian cyst present  No cervical motion tenderness, discharge, friability, lesion or polyp  Uterus is not enlarged, fixed or tender  No uterine mass detected  No urethral prolapse, tenderness or discharge present  Bladder is not tender and urgency on palpation not present  Pelvic exam was performed with patient in the lithotomy position  Breasts:     Right: No swelling, inverted nipple, mass, nipple discharge, skin change or tenderness  Left: No swelling, inverted nipple, mass, nipple discharge, skin change or tenderness     HENT: Head: Normocephalic and atraumatic  Eyes:      Conjunctiva/sclera: Conjunctivae normal    Pulmonary:      Effort: Pulmonary effort is normal    Abdominal:      General: There is no distension  Palpations: Abdomen is soft  Tenderness: There is no abdominal tenderness  Musculoskeletal:         General: Normal range of motion  Cervical back: Neck supple  Lymphadenopathy:      Upper Body:      Right upper body: No supraclavicular or axillary adenopathy  Left upper body: No supraclavicular or axillary adenopathy  Lower Body: No right inguinal adenopathy  No left inguinal adenopathy  Neurological:      Mental Status: She is alert and oriented to person, place, and time  Skin:     General: Skin is warm and dry  Psychiatric:         Mood and Affect: Mood normal          Behavior: Behavior normal          Thought Content: Thought content normal          Judgment: Judgment normal    Vitals and nursing note reviewed

## 2023-03-08 NOTE — ASSESSMENT & PLAN NOTE
Patient interested in starting birth control  She has tried numerous OCPs in the past  She does not want something she has to remember to take every day  After discussing all birth control options at length she opted for Nexplanon implant  Written information provided

## 2023-03-08 NOTE — ASSESSMENT & PLAN NOTE
ASCCP guidelines reviewed  Pap utd  Reviewed perineal hygiene and products to avoid  SBE, daily exercise and healthy diet with adequate calcium and vitamin D encouraged  Weight bearing exercises a minimum of 150 minutes/week advised  Advised to call with any issues, all concerns & questions addressed     See provided information in your after visit summary

## 2023-03-08 NOTE — PATIENT INSTRUCTIONS
Birth Control Implant   AMBULATORY CARE:   What you need to know about a birth control implant:  A birth control implant is a small device that releases hormones to prevent ovulation and pregnancy  The device is inserted under the skin on the inside of your non-dominant upper arm  It can be in place for up to 3 years before it needs to be removed or replaced  How to prepare for a birth control implant:  Tell your healthcare provider about any medical condition you have  Also tell him or her if you are currently breastfeeding  Your provider will talk to you about how to prepare for your procedure  You will need to have a test to make sure you are not pregnant  Your provider will tell you when to come into have the implant placed  What happens during the birth control implant procedure: You will lie on your back with your non-dominant arm out and bent up so your hand is near your head  Your healthcare provider will forrest the area on your arm where the implant will be inserted  A spray may be used to numb the skin where the implant will be placed  You may also be given a shot of local anesthesia to numb the procedure area  Your healthcare provider will gently stretch the skin area where the implant will go  The applicator will be placed against your arm and the needle inserted under your skin  The applicator is then used to insert the implant in your arm through the needle  Your healthcare provider will feel the area to make sure the implant is in the proper place  A bandage will be placed over the area and covered with another bandage that applies pressure  What happens after the implant is inserted: You will be able to remove the top bandage 24 hours after your procedure  The second bandage may need to stay on for 3 to 5 days  Follow up with your healthcare provider as directed  You will need to keep yearly appointments to have your blood pressure checked while the implant is in place    Risks of a birth control implant: You may have an allergic reaction to the implant  The implant may be inserted in the wrong area or too deep and may need to be removed  You may become pregnant if the implant is not placed correctly  You may have pain, numbness, bruising, or bleeding at the site  You may get an infection  You may have changes to your monthly period, such as how long and how much you bleed  Your period may stop  You may have headaches, mood changes, acne, breast pain, abdominal discomfort, and some weight gain  You may also be at increased risk for a blood clot  A birth control implant does not protect against sexually transmitted infections  Call your local emergency number (911 in the [address]) for any of the following: You have wheezing, coughing, or trouble breathing  Your throat tightens, you have trouble swallowing, or your lips or tongue swell  You feel lightheaded, short of breath, and have chest pain  Call your doctor or gynecologist if:   You have a fever or chills  You have a skin rash, hives, swelling, or itching  Your implant site is red, swollen, or draining pus  You spot or bleed for 2 weeks or more than 5 times in 3 months  You have questions or concerns about your procedure  Care for your implant site: Follow your healthcare provider's instructions for how to clean the area  You will be able to remove the top bandage 24 hours after your procedure  The second bandage may need to stay on for 3 to 5 days  Ask about medicines:  Certain medicines can prevent the implant from working correctly  Talk to your healthcare provider before you start any new medicine while you have the implant  This includes prescription and over-the-counter medicines  Follow up with your doctor or gynecologist as directed: You will need to keep yearly appointments to have your blood pressure checked while the implant is in place   Write down your questions so you remember to ask them during your visits  © Copyright [facility] [employer] 2022 Information is for End User's use only and may not be sold, redistributed or otherwise used for commercial purposes  The above information is an  only  It is not intended as medical advice for individual conditions or treatments  Talk to your doctor, nurse or pharmacist before following any medical regimen to see if it is safe and effective for you

## 2023-03-08 NOTE — PROGRESS NOTES
Patient is here today for a gynecological annual exam    C/o difficulty holding urine when sneezing  LMP: 23   Menarche age: 17    BC: Would like to discuss options    Last pap: 10/28/2021     Hx of abnormal paps?  Yes

## 2023-03-09 ENCOUNTER — TELEPHONE (OUTPATIENT)
Dept: OBGYN CLINIC | Facility: CLINIC | Age: 33
End: 2023-03-09

## 2023-03-09 DIAGNOSIS — N39.3 STRESS INCONTINENCE: Primary | ICD-10-CM

## 2023-03-09 NOTE — TELEPHONE ENCOUNTER
Pt saw Debbi yesterday but forgot to mention to her that she is having trouble holding bladder when sneezing/cough, even when just sitting  This issue has worsen over the past year  Please call pt to discuss

## 2023-03-10 DIAGNOSIS — N39.3 STRESS INCONTINENCE IN FEMALE: Primary | ICD-10-CM

## 2023-03-10 NOTE — TELEPHONE ENCOUNTER
No further work up if she is not having any additional symptoms like burning or frequency  If only occurring during coughing/sneezing, sounds like stress incontinence  I can place a referral to pelvic floor PT to see if this helps

## 2023-03-21 ENCOUNTER — TELEPHONE (OUTPATIENT)
Dept: OBGYN CLINIC | Facility: CLINIC | Age: 33
End: 2023-03-21

## 2023-03-21 NOTE — TELEPHONE ENCOUNTER
----- Message from Lisha Larios sent at 3/8/2023  5:23 PM EST -----  Regarding: Nexplanon  Hello, Can we please try and obtain authorization for a Nexplanon?  Thank you!!

## 2023-03-30 NOTE — TELEPHONE ENCOUNTER
Unable to get into [facility] office to label the device  I will write out name and place in basket by [facility] office door

## 2023-04-11 PROBLEM — Z30.09 BIRTH CONTROL COUNSELING: Status: RESOLVED | Noted: 2023-03-08 | Resolved: 2023-04-11

## 2023-04-11 PROBLEM — Z01.419 ENCOUNTER FOR ANNUAL ROUTINE GYNECOLOGICAL EXAMINATION: Status: RESOLVED | Noted: 2023-03-08 | Resolved: 2023-04-11

## 2023-05-22 NOTE — PROGRESS NOTES
"PT Evaluation     Today's date: 2023  Patient name: Daisy To  : [id number]  MRN: [id number]  Referring provider: Anny Neely  Dx:   Encounter Diagnosis     ICD-10-CM    1  Stress incontinence  N39 3           Start Time:   Stop Time:   Total time in clinic (min): 59 minutes    Assessment  Assessment details: Maria Guadalupe Valdez is a 29year old female who presents with increased tone PFM greater left with delayed relaxation post activation  Also impairments of limited mobility/flexibility in global musculature including hamstring, piriformis and adductor musculature  These impairments may contribute to urinary symptoms including stress related incontinence  Time spent in patient education regarding toileting body mechanics, PFM anatomy, bladder health  Patient could benefit from a trial of PFPT to address presenting impairments and functional limitations and improve overall quality of life  Impairments: abnormal muscle firing, abnormal muscle tone, abnormal or restricted ROM, impaired physical strength and lacks appropriate home exercise program  Understanding of Dx/Px/POC: good   Prognosis: good    Goals  ST  Patient will demonstrate independence in an ongoing home exercise program that will be ongoing throughout episode of care  2  Patient will complete a two day bladder diary within two weeks  3   Patient will demonstrate proper posture for best voiding within two weeks  LT  Patient will demonstrate use of a functional PFM contraction by performing a pre-contraction (\"knack\") to reduce UI during a cough or sneeze  2   Patient will perform 30 minutes of exercise without urinary leakage  3   Patient will have increased time of 2-3  hours between voids for increased participation in social and recreational activities within twelve weeks  4   Increased PFM strength to 3/5     at 7 second hold for 10 repetitions within 12 weeks  5  Full relaxation of PFM post activation  6   No " TTP PFM - PC/ND    Plan  Patient would benefit from: skilled physical therapy  Planned therapy interventions: manual therapy, motor coordination training, neuromuscular re-education, patient education, behavior modification, therapeutic activities, therapeutic exercise and home exercise program  Frequency: 1x week  Duration in weeks: 10  Treatment plan discussed with: patient        PT Pelvic Floor Subjective:   History of Present Illness:   Notes VILMA symptoms for sometime  Over the past year or maybe longer  Notes happens with sitting and sneezing; does better with standing  Notes also with coughing in either position  Notes sometimes leaks on the way to the bathroom  Wearing liners daily over the past 6 months due to unpredictable leakage but not happening every day  Notes some urge related incontinence with leakage on the way to the bathroom  Quality of life: good    Social Support:     Lives with:  Significant other    Relationship status: /committed    Work status: employed full time    Life stress severity: moderate (increased stress with work and planning a wedding)  Diet and Exercise:      Exercise type: walking    Exercise frequency: daily  OB/ gyn History    Gestational History:     Prior Pregnancy: No      Menstrual History:      Painful periods:  No difficulty managing menstrual pain  Bladder Function:     Voiding Difficulties comments:     Urinary leakage: urine leakage    Urinary leakage aggravated by: coughing, sneezing and walking to the bathroom    Fluid Intake Type:  Coffee, water and alcohol    Intake (ounces):      Intake (ounces) comment: Coffee - 16- 24 ounces  Water - 3 large bottles per day  Wine/beer occasional  Bowel Function:     Bowel Function comments:  Denies bowel issue  Advised in use of squatty potty today    Bowel frequency: daily  Sexual Function:     Sexually Active:  Sexually active    Pain during intercourse: No    Pain:     Location:  Lower back pain - never medically treated  Patient Goals:     Patient goals for therapy:  Improved bladder or bowel function and improved quality of life    Other patient goals:  Accident free with sneezing; exercises to maintain strength in the future;      Objective     Active Range of Motion     Lumbar   Flexion:  WFL  Extension:  with pain Restriction level: minimal  Left lateral flexion:  WFL  Right lateral flexion:  WFL    General Comments:      Hip Comments   Moderated hamstring, piriformis and adductor tightness bilaterally  4-/5 strength  Pelvic Floor Exam   Abdominal assessment: To be assessed next visit    General Perineum Exam:   perineum intact  Visual Inspection of Perineum:   Excursion of perineal body in cephalad direction with contraction of pelvic floor muscles (PFM): fair   Excursion of perineal body in caudal direction with relaxation of pelvic floor muscles (PFM): fair   Involuntary contraction with coughing: no  Involuntary relaxation with bearing down: yes    Pelvic Organ Prolapse   no pelvic organ prolapse    Pelvic Floor Muscle Exam     Palpation   Moderate increased muscle tension in the puborectalis and pubococcygeus  Minimal tenderness on left in the puborectalis and pubococcygeus  Moderate tenderness on left in the puborectalis and pubococcygeus    Breathing pattern with contraction: holding breath  Pelvic floor muscle relaxation is delayed       PERFECT Score   Power right: 3/5  Power left: 3/5  Endurance (seconds to max): 4  Repetitions (before fatigue): 4  Fast flicks (in 10 seconds): 4               Precautions:       Manuals                                                                 Neuro Re-Ed                                                                                                        Ther Ex             CRK eval            PFM relax verbal                                                                                          Ther Activity             Bladder health  HO              PFM education PP            Gait Training                                       Modalities

## 2023-05-24 ENCOUNTER — EVALUATION (OUTPATIENT)
Dept: PHYSICAL THERAPY | Age: 33
End: 2023-05-24

## 2023-05-24 DIAGNOSIS — N39.3 STRESS INCONTINENCE: Primary | ICD-10-CM

## 2023-05-24 DIAGNOSIS — N39.3 STRESS INCONTINENCE IN FEMALE: ICD-10-CM

## 2023-06-07 ENCOUNTER — APPOINTMENT (OUTPATIENT)
Dept: PHYSICAL THERAPY | Age: 33
End: 2023-06-07
Payer: COMMERCIAL

## 2023-06-07 DIAGNOSIS — N39.3 STRESS INCONTINENCE IN FEMALE: ICD-10-CM

## 2023-06-07 DIAGNOSIS — N39.3 STRESS INCONTINENCE: Primary | ICD-10-CM

## 2023-06-07 NOTE — PROGRESS NOTES
Daily Note     Today's date: 2023  Patient name: Chetan Edaurdo  :   MRN: [id number]  Referring provider: Tad Chaudhry  Dx:   Encounter Diagnosis     ICD-10-CM    1  Stress incontinence  N39 3       2  Stress incontinence in female  N39 3                      Subjective: ***      Objective: See treatment diary below      Assessment: Tolerated treatment {Tolerated treatment :}   Patient {assessment:}      Plan: {PLAN:}     Precautions:       Manuals                                                                 Neuro Re-Ed                                                                                                        Ther Ex             CRK eval            PFM relax verbal                                                                                          Ther Activity             Bladder health  HO              PFM education PP            Gait Training                                       Modalities

## 2023-06-27 NOTE — PROGRESS NOTES
"Daily Note     Today's date: 2023  Patient name: Mikaela Butts  : [id number]  MRN: [id number]  Referring provider: Karli Mccoy  Dx:   Encounter Diagnosis     ICD-10-CM    1  Stress incontinence  N39 3                      Subjective: Notes when anxious seems to be in a cycle of drinking coffee and water and increased frequency  Has been attempting some urge suppression including walking for distraction and having success  Aware that anxiety      Objective: See treatment diary below      Assessment: Tolerated treatment well  Patient would benefit from continued PT Emphasis on PFM relaxation post activation  HEP instruction this date  Verbalized and demonstrated understanding  Written handouts provided  Verbal cues for breath sequencing that mproved with practice  Plan: Continue per plan of care        Precautions:       Manuals                                                                 Neuro Re-Ed                                       Ther Ex             CRK eval            PFM relax verbal                         LE stretch  10'  HEP           Diaphragmatic reathing  3'  HEP           CRK  3\"/10'/10x  HEP           Ball with Kegel  3\"/10x  HEP           TB with ER  BTB  10x           TB with ER/exhale and Kegel  BTB  10x  HEP                                                  Ther Activity             Bladder health  HO              PFM education PP            Gait Training                                       Modalities                                            "

## 2023-06-28 ENCOUNTER — OFFICE VISIT (OUTPATIENT)
Dept: PHYSICAL THERAPY | Age: 33
End: 2023-06-28
Payer: COMMERCIAL

## 2023-06-28 DIAGNOSIS — N39.3 STRESS INCONTINENCE: Primary | ICD-10-CM

## 2023-06-28 PROCEDURE — 97110 THERAPEUTIC EXERCISES: CPT | Performed by: PHYSICAL THERAPIST

## 2023-08-03 ENCOUNTER — OFFICE VISIT (OUTPATIENT)
Dept: PHYSICAL THERAPY | Age: 33
End: 2023-08-03
Payer: COMMERCIAL

## 2023-08-03 DIAGNOSIS — N39.3 STRESS INCONTINENCE: Primary | ICD-10-CM

## 2023-08-03 PROCEDURE — 97110 THERAPEUTIC EXERCISES: CPT | Performed by: PHYSICAL THERAPIST

## 2023-08-03 NOTE — PROGRESS NOTES
Daily Note     Today's date: 8/3/2023  Patient name: Tasneem Carranza  :   MRN: [id number]  Referring provider: Stephane Bernal  Dx:   Encounter Diagnosis     ICD-10-CM    1. Stress incontinence  N39.3                      Subjective: Notes no pain. Notes leakage with cough and sneeze. Notes she has been doing CRK daily. Notes increased awareness of tension holding in PFM . Notes having an emotional time lately with brain fog and wonders if [facility] implant is causing some of her symptoms. Objective: See treatment diary below      Assessment: Tolerated treatment well. Patient would benefit from continued PTFatigue with FPM post 8 repetitions. Reviewed HEP with written handouts provided. Reviewed KNACK prior to sneeze and cough. Encouraged daily HEP. Plan: Continue per plan of care.       Precautions:       Manuals  6/28 8/3                                                              Neuro Re-Ed                                       Ther Ex             CRK eval            PFM relax verbal                         LE stretch  10'  HEP 10          Diaphragmatic breathing  3'  HEP           CRK  3"/10'/10x  HEP 9x          Ball with Kegel  3"/10x  HEP 3"/10x            TB with ER  BTB  10x 10x          TB with ER/exhale and Kegel  BTB  10x  HEP 10x                                                 Ther Activity             Bladder health  HO              PFM education PP            Gait Training                                       Modalities

## 2023-08-10 ENCOUNTER — APPOINTMENT (OUTPATIENT)
Dept: PHYSICAL THERAPY | Age: 33
End: 2023-08-10
Payer: COMMERCIAL

## 2023-08-31 ENCOUNTER — OFFICE VISIT (OUTPATIENT)
Dept: PHYSICAL THERAPY | Age: 33
End: 2023-08-31
Payer: COMMERCIAL

## 2023-08-31 DIAGNOSIS — N39.3 STRESS INCONTINENCE: Primary | ICD-10-CM

## 2023-08-31 PROCEDURE — 97110 THERAPEUTIC EXERCISES: CPT | Performed by: PHYSICAL THERAPIST

## 2023-08-31 NOTE — PROGRESS NOTES
Daily Note     Today's date: 2023  Patient name: Delta Rabago  : [birth year]  MRN: [id number]  Referring provider: Eva Carias  Dx:   Encounter Diagnosis     ICD-10-CM    1. Stress incontinence  N39.3           Start Time: 48  Stop Time: 1700  Total time in clinic (min): 55 minutes    Subjective: Notes some improvement with hard sneeze and no leakage last week. Notes occasional leakage with coughing. Notes rare occasional sharp shooting vaginal pain that is short lived and is random. Relates this happens every couple of months. Notes she has talked to GYN about this and that it has happened over the past 10 years or so. Objective: See treatment diary below      Assessment: Tolerated treatment well. Patient would benefit from continued PTHEP additions as noted with good tolerance. Slow steady gains strength and function. Discussed incorporating direct PFM STM/MT to address PFM tone and pain. Patient agreed      Plan: Continue per plan of care.       Precautions:       Manuals  6/28 8/3 8/31                      Direct STM PFM                          Ther Ex             CRK eval            PFM relax verbal                         LE stretch  10'  HEP 10 10         Diaphragmatic breathing  3'  HEP           CRK  3"/10'/10x  HEP 9x 4"/10"/10x         Ball with Kegel  3"/10x  HEP 3"/10x   10x         TB with ER  BTB  10x 10x 10x         TB with ER/exhale and Kegel  BTB  10x  HEP 10x 10x         Segmental bridge    10x  HEP         clam    10x  HEP                      Ther Activity             Bladder health  HO              PFM education PP            Gait Training                                       Modalities

## 2023-09-14 ENCOUNTER — OFFICE VISIT (OUTPATIENT)
Dept: PHYSICAL THERAPY | Age: 33
End: 2023-09-14
Payer: COMMERCIAL

## 2023-09-14 DIAGNOSIS — N39.3 STRESS INCONTINENCE: Primary | ICD-10-CM

## 2023-09-14 PROCEDURE — 97110 THERAPEUTIC EXERCISES: CPT | Performed by: PHYSICAL THERAPIST

## 2023-09-14 PROCEDURE — 97140 MANUAL THERAPY 1/> REGIONS: CPT | Performed by: PHYSICAL THERAPIST

## 2023-09-14 NOTE — PROGRESS NOTES
Daily Note     Today's date: 2023  Patient name: Neeta Larson  : [birth year]  MRN: [id number]  Referring provider: Kenyatta Linton  Dx:   Encounter Diagnosis     ICD-10-CM    1. Stress incontinence  N39.3                      Subjective: Patient notes she experienced the sharp vaginal to pelvic pain this week that lasted 4 seconds or so and happened randomly. Notes slacked a bit on exercises and urge suppression with some increased frequency. Objective: See treatment diary below      Assessment: Tolerated treatment well. Patient would benefit from continued PT Reviewed and performed all exercises. Some left hip tightness reported with ER Kegel combo exercise. Initiated direct PFM STM with good tolerance. Increased tone left TN/PC that improved post session. Plan: Continue per plan of care.       Precautions:       Manuals  6/28 8/3 8/31 9/14                     Direct STM PFM     PP                     Ther Ex             CRK eval            PFM relax verbal                         LE stretch  10'  HEP 10 10 10        Diaphragmatic breathing  3'  HEP           CRK  3"/10'/10x  HEP 9x 4"/10"/10x         Ball with Kegel  3"/10x  HEP 3"/10x   10x 10x        TB with ER  BTB  10x 10x 10x 10x        TB with ER/exhale and Kegel  BTB  10x  HEP 10x 10x 10x        Segmental bridge    10x  HEP 5x        clam    10x  HEP 5x each                     Ther Activity             Bladder health  HO              PFM education PP            Gait Training                                       Modalities

## 2023-09-28 ENCOUNTER — APPOINTMENT (OUTPATIENT)
Dept: PHYSICAL THERAPY | Age: 33
End: 2023-09-28
Payer: COMMERCIAL

## 2023-09-28 DIAGNOSIS — N39.3 STRESS INCONTINENCE: Primary | ICD-10-CM

## 2023-09-28 NOTE — PROGRESS NOTES
Daily Note     Today's date: 2023  Patient name: Angel Luis Arias  : [birth year]  MRN: [id number]  Referring provider: Jessica Faustin  Dx:   Encounter Diagnosis     ICD-10-CM    1. Stress incontinence  N39.3                      Subjective: ***      Objective: See treatment diary below      Assessment: Tolerated treatment {Tolerated treatment :1667545787}. Patient {assessment:}      Plan: {PLAN:9621441633}         POC expires Unit limit Auth Expiration date PT/OT + Visit Limit?    *** *** *** 36                           Visit/Unit Tracking  AUTH Status:  Date               No auth Used                FOTO                       Precautions:       Manuals  6/28 8/3 8/31 9/14                     Direct STM PFM     PP                     Ther Ex             CRK eval            PFM relax verbal                         LE stretch  10'  HEP 10 10 10        Diaphragmatic breathing  3'  HEP           CRK  3"/10'/10x  HEP 9x 4"/10"/10x         Ball with Kegel  3"/10x  HEP 3"/10x   10x 10x        TB with ER  BTB  10x 10x 10x 10x        TB with ER/exhale and Kegel  BTB  10x  HEP 10x 10x 10x        Segmental bridge    10x  HEP 5x        clam    10x  HEP 5x each                     Ther Activity             Bladder health  HO              PFM education PP            Gait Training                                       Modalities

## 2023-10-03 NOTE — PROGRESS NOTES
Daily Note     Today's date: 10/4/2023  Patient name: Michael Lake  : [birth year]  MRN: [id number]  Referring provider: Anyi Steele  Dx:   Encounter Diagnosis     ICD-10-CM    1. Stress incontinence  N39.3           Start Time:   Stop Time:   Total time in clinic (min): 62 minutes    Subjective: Notes being more consistent with exercise over the past few weeks. Has near doubled water intake over the past three weeks. Notes increased episodes of VILMA and void interval of about two hours. Objective: See treatment diary below      Assessment: Tolerated treatment well. Patient would benefit from continued PT Patient to continue with HEP. Emphasis on use of KNACK prior to increases in intraabdominal pressure. Also encouraged PFM full relaxation between activations to ensure  full excursion of muscle. Direct PFM with good response. Improved tone with ischemic pressure and STM. Education in self release techniques with patient to consider. Plan: Continue per plan of care. Reeval next visit in one month. Patient to continue with HEP. Patient to start PT in two weeks at [facility] for back pain.       Visit/Unit Tracking  AUTH Status:  Date 10/4              No auth Used 6              36 visit limit Remaining  30                                      Precautions:       Manuals  6/28 8/3 8/31 9/14 10/4                    Direct STM PFM     PP PP                    Ther Ex             CRK eval     5       PFM relax verbal     5       Open book      10x'HEP       LE stretch  10'  HEP 10 10 10 10       Hip flexor stretch      2x       Diaphragmatic breathing  3'  HEP    10       CRK  3"/10'/10x  HEP 9x 4"/10"/10x         Ball with Kegel  3"/10x  HEP 3"/10x   10x 10x        TB with ER  BTB  10x 10x 10x 10x        TB with ER/exhale and Kegel  BTB  10x  HEP 10x 10x 10x        Segmental bridge    10x  HEP 5x        clam    10x  HEP 5x each                     Ther Activity Bladder health  HO       PP       PFM education PP     PP       Gait Training                                       Modalities

## 2023-10-04 ENCOUNTER — OFFICE VISIT (OUTPATIENT)
Dept: PHYSICAL THERAPY | Age: 33
End: 2023-10-04
Payer: COMMERCIAL

## 2023-10-04 DIAGNOSIS — N39.3 STRESS INCONTINENCE: Primary | ICD-10-CM

## 2023-10-04 PROCEDURE — 97140 MANUAL THERAPY 1/> REGIONS: CPT | Performed by: PHYSICAL THERAPIST

## 2023-10-04 PROCEDURE — 97110 THERAPEUTIC EXERCISES: CPT | Performed by: PHYSICAL THERAPIST

## 2023-10-17 ENCOUNTER — EVALUATION (OUTPATIENT)
Dept: PHYSICAL THERAPY | Facility: CLINIC | Age: 33
End: 2023-10-17
Payer: COMMERCIAL

## 2023-10-17 DIAGNOSIS — M54.6 THORACIC SPINE PAIN: Primary | ICD-10-CM

## 2023-10-17 DIAGNOSIS — M54.50 ACUTE MIDLINE LOW BACK PAIN WITHOUT SCIATICA: ICD-10-CM

## 2023-10-17 PROCEDURE — 97161 PT EVAL LOW COMPLEX 20 MIN: CPT

## 2023-10-17 NOTE — PROGRESS NOTES
PT Evaluation     Today's date: 10/17/2023  Patient name: Franklyn Dasilva  : [birth year]  MRN: [id number]  Referring provider: Marquise Durham MD  Dx:   Encounter Diagnosis     ICD-10-CM    1. Thoracic spine pain  M54.6       2. Acute midline low back pain without sciatica  M54.50           Start Time: 1800  Stop Time: 1839  Total time in clinic (min): 39 minutes    Assessment  Assessment details: Pt is a 34 y.o. female presenting to PT services with c/o thoracic spine pain as well as low back pain. Thoracic spine pain is more problematic than lumbar spine pain at time of initial evaluation, PT will focus on thoracic spine pain. Pt has significant postural weakness as well as thoracic segmental hypomobility. Pt has limited thoracic extension and tightness with global thoracic mobility. PT added prone YTI and thoracic extension and flexion AROM to pt's HEP, pt verbalized and demonstrated understanding of proper form. Pt is a good candidate for skilled physical therapy in order to improve postural musculature endurance, increase strength, decrease pain with function, and increase functional tolerance. Impairments: abnormal or restricted ROM, activity intolerance, impaired physical strength, lacks appropriate home exercise program and poor posture     Goals  STG (3 weeks):  1. Pt will improve middle trapezius strength to be at least 4/5   2. Pt will improve lower trapezius strength to be at least 4/5   3. Pt will report pain at worst as 3/10 or less     LTG (7 weeks):  1. Pt will be independent in HEP  2. Pt will improve global BUE strength to be 5/5 without increase in pain   3. Pt will be able to do the dishes without increase in pain  4.  Pt will be able to walk for >15 minutes without increase in pain    Plan  Patient would benefit from: skilled physical therapy  Planned modality interventions: biofeedback, cryotherapy, TENS, thermotherapy: hydrocollator packs and unattended electrical stimulation  Planned therapy interventions: IASTM, joint mobilization, kinesiology taping, manual therapy, massage, Napier taping, abdominal trunk stabilization, behavior modification, patient education, postural training, neuromuscular re-education, nerve gliding, strengthening, stretching, therapeutic activities, therapeutic exercise, home exercise program, functional ROM exercises and flexibility  Frequency: 1x week  Duration in weeks: 7  Plan of Care beginning date: 10/17/2023  Plan of Care expiration date: 12/8/2023  Treatment plan discussed with: patient      Subjective Evaluation    History of Present Illness  Mechanism of injury: Pt reports that it's hard to pinpoint when the back pain started because she has been ignoring it for a long time. She started paying attention more when it started interrupting her daily tasks. She states that if she does dishes for more than 5-10 minutes she gets terrible pain between the shoulder blades. She states that when she walks up inclines or >15 minutes she gets the same pain between the shoulder blades. 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 Patient Goals  Patient goals for therapy: decreased pain, return to sport/leisure activities and increased strength  Patient goal: "find ways to alleviate the pain and learn stretches and utilizing muscles to prevent a pain episode"  Pain  Pain scale: t/s: 2, l/s: 0. Pain scale at lowest: t/s: 0, l/s: 0. Pain scale at highest: t/s: 6-7, l/s: 4. Location: between shoulder blades midline t/s, midline l/s and b/l PSIS  Quality: throbbing and sharp  Alleviating factors: forward flexion, scapular retraction, lumbar extension. Aggravating factors: standing (driving, sitting for too long)    Social Support  Steps to enter house: yes (1 or 2 steps)  Stairs in house: no   Lives in: [place] house  Lives with: fiance, 2 cats.     Employment status: working ()  Hand dominance: right    Treatments  Previous treatment: massage        Objective     Active Range of Motion   Cervical/Thoracic Spine       Cervical    Flexion:  WFL and with pain  Extension:  WFL  Left lateral flexion: Neck active lateral bend left: tight. WFL  Right lateral flexion: Neck active lateral bend right: tight. WFL  Left rotation: Neck active rotation left: tight. WFL  Right rotation: Neck active rotation right: tight.     [facility]    Thoracic    Flexion:  WFL  Extension:  with pain Restriction level: moderate  Left lateral flexion:  Restriction level: minimal  Right lateral flexion:  Restriction level: minimal  Left rotation:  Restriction level: minimal  Right rotation:  Restriction level: minimal  Left Shoulder   Flexion: WFL  Abduction: WFL  External rotation BTH: WFL  Internal rotation BTB: L4     Right Shoulder   Flexion: WFL  Abduction: WFL  External rotation BTH: WFL  Internal rotation BTB: L4     Strength/Myotome Testing     Left Shoulder     Planes of Motion   Flexion: 4+   Abduction: 4   External rotation at 0°: 4+   Internal rotation at 0°: 4+     Isolated Muscles   Latissimus: 3+   Lower trapezius: 3+   Middle trapezius: 3+     Right Shoulder     Planes of Motion   Flexion: 4+   Abduction: 4   External rotation at 0°: 4+   Internal rotation at 0°: 4+     Isolated Muscles   Latissimus: 3+   Lower trapezius: 3+   Middle trapezius: 3+              Precautions: A/D  Access Code: [email or web address]    POC expires Unit limit Auth Expiration date PT/OT + Visit Limit?   12/8/23 3 12/31/23 36 - 4 visits used already - 32 left                            Visit/Unit Tracking  AUTH Status:  Date 10/17              Not required Used 1               Remaining  31                    Date 10/17            Re-Eval             FOTO             Manuals                                                                 Neuro Re-Ed             YTI 4x5            Rows             B/l shoulder ext             LPD             Wall push up plus                                       Ther Ex             UBE             Thoracic ext/flex Sit 3x10            open book stretch                                                                              Ther Activity                                       Gait Training                                       Modalities

## 2023-10-17 NOTE — LETTER
2023    Ashkan Minor MD  [address]    Patient: Valerio Stafford   YOB: 1990   Date of Visit: 10/17/2023     Encounter Diagnosis     ICD-10-CM    1. Thoracic spine pain  M54.6       2. Acute midline low back pain without sciatica  M54.50           Dear Dr. River Randall:    Thank you for your recent referral of Valerio Stafford. Please review the attached evaluation summary from Rupinder's recent visit. Please verify that you agree with the plan of care by signing the attached order. If you have any questions or concerns, please do not hesitate to call. I sincerely appreciate the opportunity to share in the care of one of your patients and hope to have another opportunity to work with you in the near future. Sincerely,    Justice Boggs, PT, DPT      Referring Provider:      I certify that I have read the below Plan of Care and certify the need for these services furnished under this plan of treatment while under my care. Ashkan Minor MD  [facility] [place] [address]  Via Fax: [phone]          PT Evaluation     Today's date: 10/17/2023  Patient name: Valerio Stafford  : [birth year]  MRN: [id number]  Referring provider: Ashkan Minor MD  Dx:   Encounter Diagnosis     ICD-10-CM    1. Thoracic spine pain  M54.6       2. Acute midline low back pain without sciatica  M54.50           Start Time: 1800  Stop Time: 1839  Total time in clinic (min): 39 minutes    Assessment  Assessment details: Pt is a 34 y.o. female presenting to PT services with c/o thoracic spine pain as well as low back pain. Thoracic spine pain is more problematic than lumbar spine pain at time of initial evaluation, PT will focus on thoracic spine pain. Pt has significant postural weakness as well as thoracic segmental hypomobility. Pt has limited thoracic extension and tightness with global thoracic mobility.  PT added prone YTI and thoracic extension and flexion AROM to pt's HEP, pt verbalized and demonstrated understanding of proper form. Pt is a good candidate for skilled physical therapy in order to improve postural musculature endurance, increase strength, decrease pain with function, and increase functional tolerance. Impairments: abnormal or restricted ROM, activity intolerance, impaired physical strength, lacks appropriate home exercise program and poor posture     Goals  STG (3 weeks):  1. Pt will improve middle trapezius strength to be at least 4/5   2. Pt will improve lower trapezius strength to be at least 4/5   3. Pt will report pain at worst as 3/10 or less     LTG (7 weeks):  1. Pt will be independent in HEP  2. Pt will improve global BUE strength to be 5/5 without increase in pain   3. Pt will be able to do the dishes without increase in pain  4. Pt will be able to walk for >15 minutes without increase in pain    Plan  Patient would benefit from: skilled physical therapy  Planned modality interventions: biofeedback, cryotherapy, TENS, thermotherapy: hydrocollator packs and unattended electrical stimulation  Planned therapy interventions: IASTM, joint mobilization, kinesiology taping, manual therapy, massage, Napier taping, abdominal trunk stabilization, behavior modification, patient education, postural training, neuromuscular re-education, nerve gliding, strengthening, stretching, therapeutic activities, therapeutic exercise, home exercise program, functional ROM exercises and flexibility  Frequency: 1x week  Duration in weeks: 7  Plan of Care beginning date: 10/17/2023  Plan of Care expiration date: 12/8/2023  Treatment plan discussed with: patient      Subjective Evaluation    History of Present Illness  Mechanism of injury: Pt reports that it's hard to pinpoint when the back pain started because she has been ignoring it for a long time. She started paying attention more when it started interrupting her daily tasks.  She states that if she does dishes for more than 5-10 minutes she gets terrible pain between the shoulder blades. She states that when she walks up inclines or >15 minutes she gets the same pain between the shoulder blades. 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 Patient Goals  Patient goals for therapy: decreased pain, return to sport/leisure activities and increased strength  Patient goal: "find ways to alleviate the pain and learn stretches and utilizing muscles to prevent a pain episode"  Pain  Pain scale: t/s: 2, l/s: 0. Pain scale at lowest: t/s: 0, l/s: 0. Pain scale at highest: t/s: 6-7, l/s: 4. Location: between shoulder blades midline t/s, midline l/s and b/l PSIS  Quality: throbbing and sharp  Alleviating factors: forward flexion, scapular retraction, lumbar extension. Aggravating factors: standing (driving, sitting for too long)    Social Support  Steps to enter house: yes (1 or 2 steps)  Stairs in house: no   Lives in: [place] house  Lives with: fiance, 2 cats. Employment status: working ()  Hand dominance: right    Treatments  Previous treatment: massage        Objective     Active Range of Motion   Cervical/Thoracic Spine       Cervical    Flexion:  WFL and with pain  Extension:  WFL  Left lateral flexion: Neck active lateral bend left: tight. WFL  Right lateral flexion: Neck active lateral bend right: tight. WFL  Left rotation: Neck active rotation left: tight. WFL  Right rotation: Neck active rotation right: tight.     [facility] [place]    Thoracic    Flexion:  WFL  Extension:  with pain Restriction level: moderate  Left lateral flexion:  Restriction level: minimal  Right lateral flexion:  Restriction level: minimal  Left rotation:  Restriction level: minimal  Right rotation:  Restriction level: minimal  Left Shoulder   Flexion: WFL  Abduction: WFL  External rotation BTH: WFL  Internal rotation BTB: L4     Right Shoulder   Flexion: WFL  Abduction: [facility] [place]  External rotation BTH: WFL  Internal rotation BTB: L4     Strength/Myotome Testing     Left Shoulder     Planes of Motion   Flexion: 4+   Abduction: 4   External rotation at 0°: 4+   Internal rotation at 0°: 4+     Isolated Muscles   Latissimus: 3+   Lower trapezius: 3+   Middle trapezius: 3+     Right Shoulder     Planes of Motion   Flexion: 4+   Abduction: 4   External rotation at 0°: 4+   Internal rotation at 0°: 4+     Isolated Muscles   Latissimus: 3+   Lower trapezius: 3+   Middle trapezius: 3+              Precautions: A/D  Access Code: [email or web address]    POC expires Unit limit Auth Expiration date PT/OT + Visit Limit?   12/8/23 3 12/31/23 36 - 4 visits used already - 28 left                            Visit/Unit Tracking  AUTH Status:  Date 10/17              Not required Used 1               Remaining  31                    Date 10/17            Re-Eval             FOTO             Manuals                                                                 Neuro Re-Ed             YTI 4x5            Rows             B/l shoulder ext             LPD             Wall push up plus                                       Ther Ex             UBE             Thoracic ext/flex Sit 3x10            open book stretch                                                                              Ther Activity                                       Gait Training                                       Modalities

## 2023-10-24 ENCOUNTER — OFFICE VISIT (OUTPATIENT)
Dept: PHYSICAL THERAPY | Facility: CLINIC | Age: 33
End: 2023-10-24
Payer: COMMERCIAL

## 2023-10-24 DIAGNOSIS — M54.6 THORACIC SPINE PAIN: Primary | ICD-10-CM

## 2023-10-24 DIAGNOSIS — M54.50 ACUTE MIDLINE LOW BACK PAIN WITHOUT SCIATICA: ICD-10-CM

## 2023-10-24 PROCEDURE — 97112 NEUROMUSCULAR REEDUCATION: CPT | Performed by: PHYSICAL THERAPIST

## 2023-10-24 NOTE — PROGRESS NOTES
Daily Note     Today's date: 10/24/2023  Patient name: Jersey Gonsalez  : [birth year]  MRN: [id number]  Referring provider: Euell Bence, MD  Dx:   Encounter Diagnosis     ICD-10-CM    1. Thoracic spine pain  M54.6       2. Acute midline low back pain without sciatica  M54.50                      Subjective: Pt reports that she was able to do the flex / ext more often then she was able to do the ITY. Objective: See treatment diary below      Assessment: Tolerated treatment well. Patient demonstrated fatigue post treatment, exhibited good technique with therapeutic exercises, and would benefit from continued PT. Pt was able to integrate program with no increased sx's. She demonstrated more fatigued on the L periscapular region compared to the R. Did not progress HEP this visit, will assess response to exercise program NV and will add more HEP as warranted. Plan: Continue per plan of care.       Precautions: A/D  Access Code: [email or web address]    POC expires Unit limit Auth Expiration date PT/OT + Visit Limit?   23 3 23 36 - 4 visits used already - 28 left                            Visit/Unit Tracking  AUTH Status:  Date 10/17 10/24             Not required Used 1 2                                 Date 10/17 10/24           Re-Eval             FOTO             Manuals                                                                 Neuro Re-Ed             YTI 4x5 4x5           Rows  YTB 2x10           B/l shoulder ext  YTB 2x10           LPD  YTB 2x10           Wall push up plus  x10                                     Ther Ex             UBE backwards for postural training  4 min           Thoracic ext/flex Sit 3x10 Sit 3x10           open book stretch  5s x15 ea                                                                            Ther Activity                                       Gait Training                                       Modalities

## 2023-10-31 NOTE — PROGRESS NOTES
PT Re-Evaluation DISCHARGE    Today's date: 2023  Patient name: Kimberlyn Capone  : [id number]  MRN: [id number]  Referring provider: Kosta Orozco  Dx:   Encounter Diagnosis     ICD-10-CM    1. Stress incontinence  N39.3           Start Time:   Stop Time:   Total time in clinic (min): 60 minutes    Assessment  Assessment details: Patient doing well with decreased episodes of urinary incontinence. Increased awareness of tension holding in PFM with improved voluntary relaxation. At this time, patient has achieved their maximum functional benefit from skilled physical therapy services and will be discharged to their HEP. Patient is in agreement with the plan of care. As a result, patient is discharged from physical therapy. Goals  Goals  STG: - MET  1. Patient will demonstrate independence in an ongoing home exercise program that will be ongoing throughout episode of care. 2.. Patient will complete a two day bladder diary within two weeks. - NP  3. Patient will demonstrate proper posture for best voiding within two weeks. - MET    LTG: MET  1. Patient will demonstrate use of a functional PFM contraction by performing a pre-contraction ("knack") to reduce UI during a cough or sneeze. 2.  Patient will perform 30 minutes of exercise without urinary leakage. 3.  Patient will have increased time of 2-3  hours between voids for increased participation in social and recreational activities within twelve weeks. 4.  Increased PFM strength to 3/5     at 7 second hold for 10 repetitions within 12 weeks  5. Full relaxation of PFM post activation  6.  No TTP PFM - PC/MI    Plan  Plan details: Discharge PT to self care and [facility]        PT Pelvic Floor Subjective:   History of Present Illness:   Notes feeling stronger in PFM with more of an awareness of tension holding and ability to relax better  Reduced VILMA occurrence    Notes random deep pelvic shooting pain deep pubic bone  that remains short lived. Experiences 2 times per month. Quality of life: good    Social Support:     Lives with:  Significant other    Relationship status: /committed    Work status: employed full time    Life stress severity: moderate (increased stress with work and planning a wedding)  Diet and Exercise:      Exercise type: walking    Exercise frequency: daily  OB/ gyn History    Gestational History:     Prior Pregnancy: No      Menstrual History:      Painful periods:  No difficulty managing menstrual pain  Bladder Function:      Voiding Difficulties comments:     Urinary leakage: urine leakage    Urinary leakage aggravated by: coughing, sneezing and walking to the bathroom    Fluid Intake Type:  Coffee, water and alcohol    Intake (ounces): Intake (ounces) comment: Coffee - 16- 24 ounces  Water - 3 large bottles per day  Wine/beer occasional  Bowel Function:     Bowel Function comments:  Denies bowel issue  Advised in use of squatty potty today    Bowel frequency: daily  Sexual Function:     Sexually Active:  Sexually active    Pain during intercourse: No    Pain:     Location:  Lower back pain - never medically treated; Patient Goals:     Patient goals for therapy:  Improved bladder or bowel function and improved quality of life    Other patient goals:  Accident free with sneezing; exercises to maintain strength in the future;      Objective     Active Range of Motion     Lumbar   Flexion:  WFL  Extension:  with pain Restriction level: minimal  Left lateral flexion:  WFL  Right lateral flexion:  WFL    General Comments:      Hip Comments   Moderated hamstring, piriformis and adductor tightness bilaterally  Independent in self stretch        Abdominal Assessment:          General Perineum Exam:   perineum intact.      Visual Inspection of Perineum:   Excursion of perineal body in cephalad direction with contraction of pelvic floor muscles (PFM): fair   Excursion of perineal body in caudal direction with relaxation of pelvic floor muscles (PFM): fair   Involuntary contraction with coughing: yes  Involuntary relaxation with bearing down: yes    Pelvic Organ Prolapse   no pelvic organ prolapse        Pelvic Floor Muscle Exam:      Breathing pattern with contraction: within normal limits and holding breath   Pelvic floor muscle relaxation is delayed and complete. PERFECT Score   Power right: 3/5   Power left: 3/5   Endurance (seconds to max): 4   Repetitions (before fatigue): 4   Fast flicks (in 10 seconds): 8   Perfect Score: Improved tone with decreased TTP  Responds well to direct techniques to PFM. Verbal instruction for self release provided. Verbalized understanding          Graphical documentation:      To be assessed next visit                   Visit/Unit Tracking  AUTH Status:  Date 10/4 11/1             No auth Used 6 7 36 visit limit Remaining  30                                      Precautions:       Manuals  6/28 8/3 8/31 9/14 10/4 11/1                   Direct STM PFM     PP PP PP                   Ther Ex             CRK eval     5 4"/10"/5x      PFM relax verbal     5 5'      Open book      10x'HEP       LE stretch  10'  HEP 10 10 10 10 10      Hip flexor stretch      2x       Diaphragmatic breathing  3'  HEP    10       CRK  3"/10'/10x  HEP 9x 4"/10"/10x 4"/10"5x  4"/10"/5x      Ball with Kegel  3"/10x  HEP 3"/10x   10x 10x  10x      TB with ER  BTB  10x 10x 10x 10x  5x      TB with ER/exhale and Kegel  BTB  10x  HEP 10x 10x 10x  5x      Segmental bridge    10x  HEP 5x  5x      clam    10x  HEP 5x each  10x                   Ther Activity             Bladder health  HO       PP       PFM education PP     PP       Gait Training                                       Modalities

## 2023-11-01 ENCOUNTER — EVALUATION (OUTPATIENT)
Dept: PHYSICAL THERAPY | Age: 33
End: 2023-11-01
Payer: COMMERCIAL

## 2023-11-01 DIAGNOSIS — N39.3 STRESS INCONTINENCE: Primary | ICD-10-CM

## 2023-11-01 PROCEDURE — 97110 THERAPEUTIC EXERCISES: CPT | Performed by: PHYSICAL THERAPIST

## 2023-11-01 PROCEDURE — 97140 MANUAL THERAPY 1/> REGIONS: CPT | Performed by: PHYSICAL THERAPIST

## 2023-11-09 ENCOUNTER — OFFICE VISIT (OUTPATIENT)
Dept: PHYSICAL THERAPY | Facility: CLINIC | Age: 33
End: 2023-11-09
Payer: COMMERCIAL

## 2023-11-09 DIAGNOSIS — M54.50 ACUTE MIDLINE LOW BACK PAIN WITHOUT SCIATICA: ICD-10-CM

## 2023-11-09 DIAGNOSIS — M54.6 THORACIC SPINE PAIN: Primary | ICD-10-CM

## 2023-11-09 PROCEDURE — 97112 NEUROMUSCULAR REEDUCATION: CPT

## 2023-11-09 PROCEDURE — 97110 THERAPEUTIC EXERCISES: CPT

## 2023-11-09 NOTE — PROGRESS NOTES
Daily Note     Today's date: 2023  Patient name: Rudi Sanchez  : [id number]  MRN: [id number]  Referring provider: Brain Pinto MD  Dx:   Encounter Diagnosis     ICD-10-CM    1. Thoracic spine pain  M54.6       2. Acute midline low back pain without sciatica  M54.50             Start Time: 1800  Stop Time: 1840  Total time in clinic (min): 40 minutes    Subjective: Pt reports that she is doing okay, she was having trouble remembering the correct positioning for her hands during her HEP. Objective: See treatment diary below      Assessment: Pt responds favorably to today's session with decreased pain and improved thoracic mobility. PT reviewed HEP, pt verbalized and demonstrated understanding of proper form. Pt will continue to benefit from skilled physical therapy in order to improve thoracic mobility and strength. Plan: Continue per plan of care.       Precautions: A/D  Access Code: [email or web address]    POC expires Unit limit Auth Expiration date PT/OT + Visit Limit?   23 3 23 36 - 4 visits used already - 28 left                            Visit/Unit Tracking  AUTH Status:  Date 10/17 10/24 11/9            Not required Used 1 2 3             Remaining  31 30 29                  Date 10/17 10/24 11/9          Re-Eval             FOTO             Manuals                                                                 Neuro Re-Ed             YTI 4x5 4x5 2x10          Rows  YTB 2x10 YTB 3x10          B/l shoulder ext  YTB 2x10 YTB 3x10          LPD  YTB 2x10 YTB 2x10          Wall push up plus  x10 3x5                                     Ther Ex             UBE backwards for postural training  4 min 3'/3'          Thoracic ext/flex Sit 3x10 Sit 3x10           open book stretch  5s x15 ea 5"x10 ea          Thoracic ext butterflies 1/2 foam   X25                                                               Ther Activity                                       Gait Training Modalities

## 2023-11-16 ENCOUNTER — APPOINTMENT (OUTPATIENT)
Dept: PHYSICAL THERAPY | Facility: CLINIC | Age: 33
End: 2023-11-16
Payer: COMMERCIAL

## 2023-11-21 ENCOUNTER — APPOINTMENT (OUTPATIENT)
Dept: PHYSICAL THERAPY | Facility: CLINIC | Age: 33
End: 2023-11-21
Payer: COMMERCIAL

## 2023-11-30 ENCOUNTER — OFFICE VISIT (OUTPATIENT)
Dept: PHYSICAL THERAPY | Facility: CLINIC | Age: 33
End: 2023-11-30
Payer: COMMERCIAL

## 2023-11-30 DIAGNOSIS — M54.50 ACUTE MIDLINE LOW BACK PAIN WITHOUT SCIATICA: ICD-10-CM

## 2023-11-30 DIAGNOSIS — M54.6 THORACIC SPINE PAIN: Primary | ICD-10-CM

## 2023-11-30 PROCEDURE — 97140 MANUAL THERAPY 1/> REGIONS: CPT

## 2023-11-30 PROCEDURE — 97110 THERAPEUTIC EXERCISES: CPT

## 2023-11-30 NOTE — PROGRESS NOTES
Daily Note     Today's date: 2023  Patient name: Santa Beltran  : [id number]  MRN: [id number]  Referring provider: Omar Acevedo MD  Dx:   Encounter Diagnosis     ICD-10-CM    1. Thoracic spine pain  M54.6       2. Acute midline low back pain without sciatica  M54.50             Start Time: 1715  Stop Time: 1800  Total time in clinic (min): 45 minutes    Subjective: Pt reports that she has been having a lot of pain in her back and in her L side specifically. Objective: See treatment diary below    Assessment:  Patient able to complete the exercise program with no increase in pain. Patient experience relief in muscle tightness after manuals. Patient experience some relief with thoracic extension exercises. Pt will continue to benefit from skilled physical therapy in order to improve thoracic mobility and strength. Plan: Continue per plan of care.       Precautions: A/D  Access Code: [email or web address]    POC expires Unit limit Auth Expiration date PT/OT + Visit Limit?   23 3 23 36 - 4 visits used already - 28 left                            Visit/Unit Tracking  AUTH Status:  Date 10/17 10/24 11/9 11/30           Not required Used 1 2 3 4            Remaining  31 30 29 28                 Date 10/17 10/24 11/9 11/30         Re-Eval             FOTO             Manuals                  t/s CPA GIII-IV  SC             t/s L UPA GIII-IV  SC             CTJ GV  SC             L subscapularis release  SC         Neuro Re-Ed             YTI 4x5 4x5 2x10 Prone 2x10 ea         Rows  YTB 2x10 YTB 3x10 YTB 3x10         B/l shoulder ext  YTB 2x10 YTB 3x10 YTB 3x10         LPD  YTB 2x10 YTB 2x10 YTB 2x10         Wall push up plus  x10 3x5  3x5                                   Ther Ex             UBE backwards for postural training  4 min 3'/3' 3'/3'         Thoracic ext/flex Sit 3x10 Sit 3x10           open book stretch  5s x15 ea 5"x10 ea 5" 10x ea         Thoracic ext butterflies 1/2 foam   X25  25x LTR 10" 10x ea             Lat stretch "robots" - 5"x10                                   Ther Activity                                       Gait Training                                       Modalities

## 2023-12-07 ENCOUNTER — EVALUATION (OUTPATIENT)
Dept: PHYSICAL THERAPY | Facility: CLINIC | Age: 33
End: 2023-12-07
Payer: COMMERCIAL

## 2023-12-07 DIAGNOSIS — M54.6 THORACIC SPINE PAIN: Primary | ICD-10-CM

## 2023-12-07 DIAGNOSIS — M54.50 ACUTE MIDLINE LOW BACK PAIN WITHOUT SCIATICA: ICD-10-CM

## 2023-12-07 PROCEDURE — 97112 NEUROMUSCULAR REEDUCATION: CPT

## 2023-12-07 PROCEDURE — 97110 THERAPEUTIC EXERCISES: CPT

## 2023-12-07 NOTE — LETTER
2023    Ayah Horowitz, [address]    Patient: Brad Rasmussen   YOB: 1990   Date of Visit: 2023     Encounter Diagnosis     ICD-10-CM    1. Thoracic spine pain  M54.6       2. Acute midline low back pain without sciatica  M54.50           Dear Dr. Blackman Matters:    Thank you for your recent referral of Brad Rasmussen. Please review the attached evaluation summary from Rupinder's recent visit. Please verify that you agree with the plan of care by signing the attached order. If you have any questions or concerns, please do not hesitate to call. I sincerely appreciate the opportunity to share in the care of one of your patients and hope to have another opportunity to work with you in the near future. Sincerely,    Ben Stallworth, PT, DPT      Referring Provider:      I certify that I have read the below Plan of Care and certify the need for these services furnished under this plan of treatment while under my care. Ayah Horowitz MD  [address] [place] [address]  Via Fax: [phone]          PT Re-Evaluation     Today's date: 2023  Patient name: Brad Rasmussen  : [id number]  MRN: [id number]  Referring provider: Ayah Horowitz MD  Dx:   Encounter Diagnosis     ICD-10-CM    1. Thoracic spine pain  M54.6       2. Acute midline low back pain without sciatica  M54.50           Start Time: 1715  Stop Time: 1802  Total time in clinic (min): 47 minutes    Assessment  Assessment details: Pt is a 34 y.o. female presenting to PT services with c/o thoracic spine pain as well as low back pain. Pt has been participating in PT for 7 weeks and has made notable improvement in regards to postural musculature, however, continues with weakness in lower trapezius. Pt continues to have hypomobility and TTP of thoracic spine and hypermobility and TTP of lumbar spine. Pt has difficulty activating TA without diaphragmatic compensation.  PT and pt have discussed and agreed that pt will continue to benefit from skilled physical therapy in order to improve thoracic mobility, decrease pain with function, and improve postural strength. Impairments: abnormal or restricted ROM, activity intolerance, impaired physical strength, pain with function and poor posture     Goals  STG (3 weeks):  1. Pt will improve middle trapezius strength to be at least 4/5 GOAL MET  2. Pt will improve lower trapezius strength to be at least 4/5 GOAL MET  3. Pt will report pain at worst as 3/10 or less NOT MET    LTG (7 weeks):  1. Pt will be independent in HEP GOAL MET  2. Pt will improve global BUE strength to be 5/5 without increase in pain ONGOING  3. Pt will be able to do the dishes without increase in pain ONGOING  4. Pt will be able to walk for >15 minutes without increase in pain ONGOING    Plan  Patient would benefit from: skilled physical therapy  Planned modality interventions: biofeedback, cryotherapy, TENS, thermotherapy: hydrocollator packs and unattended electrical stimulation  Planned therapy interventions: IASTM, joint mobilization, kinesiology taping, manual therapy, massage, Napier taping, abdominal trunk stabilization, behavior modification, patient education, postural training, neuromuscular re-education, nerve gliding, strengthening, stretching, therapeutic activities, therapeutic exercise, home exercise program, functional ROM exercises and flexibility  Frequency: 1x week  Duration in weeks: 6  Plan of Care beginning date: 12/7/2023  Plan of Care expiration date: 1/19/2024  Treatment plan discussed with: patient      Subjective Evaluation    History of Present Illness  Mechanism of injury: Pt reports that overall she is feeling about 20% better since beginning PT. She states that the exercises are helping, but she just started doing her exercises consistently. She states that she does still have pain every day.    Patient Goals  Patient goals for therapy: decreased pain, return to sport/leisure activities and increased strength  Patient goal: "find ways to alleviate the pain and learn stretches and utilizing muscles to prevent a pain episode"  Pain  Pain scale: t/s: 4, l/s: 4. Pain scale at lowest: t/s: 0, l/s: 0. Pain scale at highest: t/s: 8, l/s: 6. Location: between shoulder blades midline t/s, midline l/s and b/l PSIS  Quality: throbbing, sharp and dull ache  Alleviating factors: forward flexion, scapular retraction, lumbar extension. Aggravating factors: standing (driving, sitting for too long)  Progression: improved    Social Support  Steps to enter house: yes (1 or 2 steps)  Stairs in house: no   Lives in: [place]  Lives with: fiance, 2 cats. Employment status: working ()  Hand dominance: right    Treatments  Previous treatment: massage        Objective     Active Range of Motion   Cervical/Thoracic Spine       Cervical    Flexion:  WFL and with pain  Extension:  WFL  Left lateral flexion: Neck active lateral bend left: tight. WFL  Right lateral flexion: Neck active lateral bend right: tight. WFL  Left rotation: Neck active rotation left: tight. WFL  Right rotation: Neck active rotation right: tight.     [facility]    Thoracic    Flexion:  WFL  Extension:  with pain Restriction level: minimal  Left lateral flexion:  Restriction level: minimal  Right lateral flexion:  Restriction level: minimal  Left rotation:  WFL  Right rotation:  WFL  Left Shoulder   Flexion: WFL  Abduction: WFL  External rotation BTH: WFL  Internal rotation BTB: L4     Right Shoulder   Flexion: WFL  Abduction: WFL  External rotation BTH: WFL  Internal rotation BTB: L4     Joint Play     Hypermobile: L1, L2, L3, L4 and L5     Hypomobile: T1, T2, T3, T4, T5, T6, T7, T8, T9 and S1     Pain: T4, L4, L5 and S1     Strength/Myotome Testing     Left Shoulder     Planes of Motion   Flexion: 4+   Abduction: 4+   External rotation at 0°: 5   Internal rotation at 0°: 5     Isolated Muscles   Latissimus: 4+   Lower trapezius: 4-   Middle trapezius: 4+     Right Shoulder     Planes of Motion   Flexion: 4+   Abduction: 4+   External rotation at 0°: 5   Internal rotation at 0°: 5     Isolated Muscles   Latissimus: 4+   Lower trapezius: 4-   Middle trapezius: 4+     Left Hip   Planes of Motion   Flexion: 5  Abduction: 5    Right Hip   Planes of Motion   Flexion: 5  Abduction: 5    Left Knee   Flexion: 5  Extension: 5    Right Knee   Flexion: 5  Extension: 5    Muscle Activation   Patient unable to activate left transverse abdominals, left multifidus, right transverse abdominals and right multifidus.           Precautions: A/D  Access Code: [email or web address]    POC expires Unit limit Auth Expiration date PT/OT + Visit Limit?   12/8/23 3 12/31/23 36 - 4 visits used already - 28 left                            Visit/Unit Tracking  AUTH Status:  Date 10/17 10/24 11/9 11/30           Not required Used 1 2 3 4            Remaining  31 30 29 28                 Date 10/17 10/24 11/9 11/30 12/7        Re-Eval     SC        FOTO     61/69        Manuals             t/s CPA      GIII-IV  SC GIII-V + SC        t/s L UPA     GIII-IV  SC         CTJ HVLA    GV  SC         L subscapularis release      SC         Neuro Re-Ed             YTI 4x5 4x5 2x10 Prone 2x10 ea         Rows  YTB 2x10 YTB 3x10 YTB 3x10 RTB x8        B/l shoulder ext  YTB 2x10 YTB 3x10 YTB 3x10 RTB x8        LPD  YTB 2x10 YTB 2x10 YTB 2x10         Wall push up plus  x10 3x5  3x5 3x8                                  Ther Ex             Pt edu     SC - HEP update, POC, and re-eval findings        UBE backwards for postural training  4 min 3'/3' 3'/3' 6' retro        Thoracic ext/flex Sit 3x10 Sit 3x10           open book stretch  5s x15 ea 5"x10 ea 5" 10x ea 5"x10 ea        Thoracic ext butterflies 1/2 foam   X25  25x         LTR     LTR 10" 10x ea         Lat stretch "robots"     5"x10                                   Ther Activity Gait Training                                       Modalities

## 2023-12-07 NOTE — PROGRESS NOTES
PT Re-Evaluation     Today's date: 2023  Patient name: Rudi Sanchez  : 1988  MRN: [id number]  Referring provider: Brain Pinto MD  Dx:   Encounter Diagnosis     ICD-10-CM    1. Thoracic spine pain  M54.6       2. Acute midline low back pain without sciatica  M54.50           Start Time: 1715  Stop Time: 1802  Total time in clinic (min): 47 minutes    Assessment  Assessment details: Pt is a 34 y.o. female presenting to PT services with c/o thoracic spine pain as well as low back pain. Pt has been participating in PT for 7 weeks and has made notable improvement in regards to postural musculature, however, continues with weakness in lower trapezius. Pt continues to have hypomobility and TTP of thoracic spine and hypermobility and TTP of lumbar spine. Pt has difficulty activating TA without diaphragmatic compensation. PT and pt have discussed and agreed that pt will continue to benefit from skilled physical therapy in order to improve thoracic mobility, decrease pain with function, and improve postural strength. Impairments: abnormal or restricted ROM, activity intolerance, impaired physical strength, pain with function and poor posture     Goals  STG (3 weeks):  1. Pt will improve middle trapezius strength to be at least 4/5 GOAL MET  2. Pt will improve lower trapezius strength to be at least 4/5 GOAL MET  3. Pt will report pain at worst as 3/10 or less NOT MET    LTG (7 weeks):  1. Pt will be independent in HEP GOAL MET  2. Pt will improve global BUE strength to be 5/5 without increase in pain ONGOING  3. Pt will be able to do the dishes without increase in pain ONGOING  4.  Pt will be able to walk for >15 minutes without increase in pain ONGOING    Plan  Patient would benefit from: skilled physical therapy  Planned modality interventions: biofeedback, cryotherapy, TENS, thermotherapy: hydrocollator packs and unattended electrical stimulation  Planned therapy interventions: IASTM, joint mobilization, kinesiology taping, manual therapy, massage, Napier taping, abdominal trunk stabilization, behavior modification, patient education, postural training, neuromuscular re-education, nerve gliding, strengthening, stretching, therapeutic activities, therapeutic exercise, home exercise program, functional ROM exercises and flexibility  Frequency: 1x week  Duration in weeks: 6  Plan of Care beginning date: 12/7/2023  Plan of Care expiration date: 1/19/2024  Treatment plan discussed with: patient      Subjective Evaluation    History of Present Illness  Mechanism of injury: Pt reports that overall she is feeling about 20% better since beginning PT. She states that the exercises are helping, but she just started doing her exercises consistently. She states that she does still have pain every day. Patient Goals  Patient goals for therapy: decreased pain, return to sport/leisure activities and increased strength  Patient goal: "find ways to alleviate the pain and learn stretches and utilizing muscles to prevent a pain episode"  Pain  Pain scale: t/s: 4, l/s: 4. Pain scale at lowest: t/s: 0, l/s: 0. Pain scale at highest: t/s: 8, l/s: 6. Location: between shoulder blades midline t/s, midline l/s and b/l PSIS  Quality: throbbing, sharp and dull ache  Alleviating factors: forward flexion, scapular retraction, lumbar extension. Aggravating factors: standing (driving, sitting for too long)  Progression: improved    Social Support  Steps to enter house: yes (1 or 2 steps)  Stairs in house: no   Lives in: [place] house  Lives with: fiance, 2 cats. Employment status: working ()  Hand dominance: right    Treatments  Previous treatment: massage        Objective     Active Range of Motion   Cervical/Thoracic Spine       Cervical    Flexion:  WFL and with pain  Extension:  WFL  Left lateral flexion: Neck active lateral bend left: tight. WFL  Right lateral flexion: Neck active lateral bend right: tight. WFL  Left rotation: Neck active rotation left: tight. WFL  Right rotation: Neck active rotation right: tight. [facility]    Thoracic    Flexion:  WFL  Extension:  with pain Restriction level: minimal  Left lateral flexion:  Restriction level: minimal  Right lateral flexion:  Restriction level: minimal  Left rotation:  WFL  Right rotation:  WFL  Left Shoulder   Flexion: WFL  Abduction: WFL  External rotation BTH: WFL  Internal rotation BTB: L4     Right Shoulder   Flexion: WFL  Abduction: WFL  External rotation BTH: WFL  Internal rotation BTB: L4     Joint Play     Hypermobile: L1, L2, L3, L4 and L5     Hypomobile: T1, T2, T3, T4, T5, T6, T7, T8, T9 and S1     Pain: T4, L4, L5 and S1     Strength/Myotome Testing     Left Shoulder     Planes of Motion   Flexion: 4+   Abduction: 4+   External rotation at 0°: 5   Internal rotation at 0°: 5     Isolated Muscles   Latissimus: 4+   Lower trapezius: 4-   Middle trapezius: 4+     Right Shoulder     Planes of Motion   Flexion: 4+   Abduction: 4+   External rotation at 0°: 5   Internal rotation at 0°: 5     Isolated Muscles   Latissimus: 4+   Lower trapezius: 4-   Middle trapezius: 4+     Left Hip   Planes of Motion   Flexion: 5  Abduction: 5    Right Hip   Planes of Motion   Flexion: 5  Abduction: 5    Left Knee   Flexion: 5  Extension: 5    Right Knee   Flexion: 5  Extension: 5    Muscle Activation   Patient unable to activate left transverse abdominals, left multifidus, right transverse abdominals and right multifidus.           Precautions: A/D  Access Code: [email or web address]    POC expires Unit limit Auth Expiration date PT/OT + Visit Limit?   12/8/23 3 12/31/23 36 - 4 visits used already - 28 left                            Visit/Unit Tracking  AUTH Status:  Date 10/17 10/24 11/9 11/30           Not required Used 1 2 3 4            Remaining  31 30 29 28                 Date 10/17 10/24 11/9 11/30 12/7        Re-Eval     SC        FOTO     61/69        Manuals             t/s [employer] [place] GIII-IV  SC GIII-V + SC        t/s L UPA     GIII-IV  SC         CTJ HVLA    GV  SC         L subscapularis release      SC         Neuro Re-Ed             YTI 4x5 4x5 2x10 Prone 2x10 ea         Rows  YTB 2x10 YTB 3x10 YTB 3x10 RTB x8        B/l shoulder ext  YTB 2x10 YTB 3x10 YTB 3x10 RTB x8        LPD  YTB 2x10 YTB 2x10 YTB 2x10         Wall push up plus  x10 3x5  3x5 3x8                                  Ther Ex             Pt edu     SC - HEP update, POC, and re-eval findings        UBE backwards for postural training  4 min 3'/3' 3'/3' 6' retro        Thoracic ext/flex Sit 3x10 Sit 3x10           open book stretch  5s x15 ea 5"x10 ea 5" 10x ea 5"x10 ea        Thoracic ext butterflies 1/2 foam   X25  25x         LTR     LTR 10" 10x ea         Lat stretch "robots"     5"x10                                   Ther Activity                                       Gait Training                                       Modalities

## 2023-12-14 ENCOUNTER — OFFICE VISIT (OUTPATIENT)
Dept: PHYSICAL THERAPY | Facility: CLINIC | Age: 33
End: 2023-12-14
Payer: COMMERCIAL

## 2023-12-14 DIAGNOSIS — M54.6 THORACIC SPINE PAIN: Primary | ICD-10-CM

## 2023-12-14 DIAGNOSIS — M54.50 ACUTE MIDLINE LOW BACK PAIN WITHOUT SCIATICA: ICD-10-CM

## 2023-12-14 PROCEDURE — 97110 THERAPEUTIC EXERCISES: CPT

## 2023-12-14 PROCEDURE — 97112 NEUROMUSCULAR REEDUCATION: CPT

## 2023-12-14 NOTE — PROGRESS NOTES
Daily Note     Today's date: 2023  Patient name: Bud Burr  : [birth year]  MRN: [id number]  Referring provider: Aranza Sun MD  Dx:   Encounter Diagnosis     ICD-10-CM    1. Thoracic spine pain  M54.6       2. Acute midline low back pain without sciatica  M54.50           Start Time: 1800  Stop Time:   Total time in clinic (min): 42 minutes    Subjective: Pt reports that she is doing her HEP more consistently. She states that she does still get pain between her shoulder blades. Objective: See treatment diary below      Assessment:  Pt responds favorably to thoracic mobility exercises. Continues with postural weakness and will benefit from continued PT in order to improve periscapular strength, decrease pain with function, and improve functional tolerance. Plan: Continue per plan of care. Progress treatment as tolerated.        Precautions: A/D  Access Code: [email or web address]    POC expires Unit limit Auth Expiration date PT/OT + Visit Limit?   23 3 23 36 - 4 visits used already - 28 left                            Visit/Unit Tracking  AUTH Status:  Date 10/17 10/24 11/9 11/30 12/7 12/14         Not required Used 1 2 3 4 5 6          Remaining  31 30 29 28 27 26               Date 10/17 10/24 11/9 11/30 12/7 12/14       Re-Eval     SC        FOTO     61/69        Manuals             t/s CPA      GIII-IV  SC GIII-V + SC        t/s L UPA     GIII-IV  SC         CTJ HVLA    GV  SC         L subscapularis release      SC         Neuro Re-Ed             YTI 4x5 4x5 2x10 Prone 2x10 ea         Rows  YTB 2x10 YTB 3x10 YTB 3x10 RTB x8 RTB 2x12       B/l shoulder ext  YTB 2x10 YTB 3x10 YTB 3x10 RTB x8 RTB 2x12       LPD  YTB 2x10 YTB 2x10 YTB 2x10         Wall push up plus  x10 3x5  3x5 3x8 2x12       Cheerleaders      RTB 2x10                    Ther Ex             Pt edu     SC - HEP update, POC, and re-eval findings        UBE backwards for postural training  4 min 3'/3' 3'/3' 6' retro 6' retro Thoracic ext/flex Sit 3x10 Sit 3x10           open book stretch  5s x15 ea 5"x10 ea 5" 10x ea 5"x10 ea 5"x10 ea       Thoracic ext butterflies 1/2 foam   X25  25x         LTR     LTR 10" 10x ea         Lat stretch "robots"     5"x10         Thread the needle c foam roll      5"x5        Thoracic prayer stretch c foam roll      5"x5                                  Ther Activity                                       Gait Training                                       Modalities

## 2023-12-21 ENCOUNTER — APPOINTMENT (OUTPATIENT)
Dept: PHYSICAL THERAPY | Facility: CLINIC | Age: 33
End: 2023-12-21
Payer: COMMERCIAL

## 2023-12-28 ENCOUNTER — APPOINTMENT (OUTPATIENT)
Dept: PHYSICAL THERAPY | Facility: CLINIC | Age: 33
End: 2023-12-28
Payer: COMMERCIAL

## 2024-01-04 ENCOUNTER — APPOINTMENT (OUTPATIENT)
Dept: PHYSICAL THERAPY | Facility: CLINIC | Age: 34
End: 2024-01-04
Payer: COMMERCIAL

## 2024-01-11 ENCOUNTER — OFFICE VISIT (OUTPATIENT)
Dept: PHYSICAL THERAPY | Facility: CLINIC | Age: 34
End: 2024-01-11
Payer: COMMERCIAL

## 2024-01-11 DIAGNOSIS — M54.50 ACUTE MIDLINE LOW BACK PAIN WITHOUT SCIATICA: ICD-10-CM

## 2024-01-11 DIAGNOSIS — M54.6 THORACIC SPINE PAIN: Primary | ICD-10-CM

## 2024-01-11 PROCEDURE — 97110 THERAPEUTIC EXERCISES: CPT

## 2024-01-11 PROCEDURE — 97112 NEUROMUSCULAR REEDUCATION: CPT

## 2024-01-11 NOTE — PROGRESS NOTES
"Daily Note     Today's date: 2024  Patient name: Rupinder Potts  : 1990  MRN: [id number]  Referring provider: Binh Tyson MD  Dx:   Encounter Diagnosis     ICD-10-CM    1. Thoracic spine pain  M54.6       2. Acute midline low back pain without sciatica  M54.50           Start Time: 1715  Stop Time: 1800  Total time in clinic (min): 45 minutes    Subjective: Pt states while she was in [place] she had no pain, now that she is back and working the pain is back.       Objective: See treatment diary below      Assessment: Pt is able to tolerate progressed resistance during today's session. Pt continues with difficulty with lower trapezius activation. Will continue to benefit from skilled PT to improve postural strengthening and periscapular strengthening.       Plan: Continue per plan of care.      Precautions: A/D  Access Code: [email or web address]    POC expires Unit limit Auth Expiration date PT/OT + Visit Limit?   23 3 23 36 - 4 visits used already - 32 left                            Visit/Unit Tracking  AUTH Status:  Date 10/17 10/24 11/9 11/30 12/7 12/14 1/11        Not required Used 1 2 3 4 5 6 7         Remaining  31 30 29 28 27 26 25              Date 10/17 10/24 11/9 11/30 12/7 12/14 1/11      Re-Eval     SC        FOTO     61/69        Manuals             t/s CPA      GIII-IV  SC GIII-V + SC        t/s L UPA     GIII-IV  SC         CTJ HVLA    GV  SC         L subscapularis release      SC         Neuro Re-Ed             YTI 4x5 4x5 2x10 Prone 2x10 ea   Prone 1# 2x10      Rows  YTB 2x10 YTB 3x10 YTB 3x10 RTB x8 RTB 2x12 GTB 2x15      B/l shoulder ext  YTB 2x10 YTB 3x10 YTB 3x10 RTB x8 RTB 2x12 GTB 2x15      LPD  YTB 2x10 YTB 2x10 YTB 2x10         Wall push up plus  x10 3x5  3x5 3x8 2x12       Cheerleaders      RTB 2x10 RTB 2x10      Supermans       1# 10\"x5      Bird Dogs       2x10                                Ther Ex             Pt edu     SC - HEP update, POC, and re-eval findings        UBE " "backwards for postural training  4 min 3'/3' 3'/3' 6' retro 6' retro  3'/3'      Thoracic ext/flex Sit 3x10 Sit 3x10           open book stretch  5s x15 ea 5\"x10 ea 5\" 10x ea 5\"x10 ea 5\"x10 ea       Thoracic ext butterflies 1/2 foam   X25  25x         LTR     LTR 10\" 10x ea         Lat stretch \"robots\"     5\"x10         Thread the needle c foam roll      5\"x5  5\"x10      Thoracic prayer stretch c foam roll      5\"x5                                  Ther Activity                                       Gait Training                                       Modalities                                                   "

## 2024-01-18 ENCOUNTER — EVALUATION (OUTPATIENT)
Dept: PHYSICAL THERAPY | Facility: CLINIC | Age: 34
End: 2024-01-18
Payer: COMMERCIAL

## 2024-01-18 DIAGNOSIS — M54.50 ACUTE MIDLINE LOW BACK PAIN WITHOUT SCIATICA: ICD-10-CM

## 2024-01-18 DIAGNOSIS — M54.6 THORACIC SPINE PAIN: Primary | ICD-10-CM

## 2024-01-18 PROCEDURE — 97110 THERAPEUTIC EXERCISES: CPT

## 2024-01-18 NOTE — PROGRESS NOTES
PT Discharge    Today's date: 2024  Patient name: Rupinder Potts  : 1990  MRN: [id number]  Referring provider: Binh Tyson MD  Dx:   Encounter Diagnosis     ICD-10-CM    1. Thoracic spine pain  M54.6       2. Acute midline low back pain without sciatica  M54.50             Start Time: 1800  Stop Time: 1830  Total time in clinic (min): 30 minutes    Assessment  Assessment details: Pt is a 33 y.o. female presenting to PT services with c/o thoracic spine pain as well as low back pain. Pt has been participating in PT for 13 weeks and has made improvement in regards to postural strength and decreased pain. PT and pt have discussed and agreed that pt has met maximum benefit of skilled physical therapy and will continue to benefit from independent performance of HEP. Pt was informed that if she has any questions or concerns, she is welcome to contact PT. Pt is discharged from skilled physical therapy at this time.     Impairments: abnormal or restricted ROM, activity intolerance, impaired physical strength, pain with function and poor posture     Goals  STG (3 weeks):  1. Pt will improve middle trapezius strength to be at least 4/5 GOAL MET  2. Pt will improve lower trapezius strength to be at least 4/5 GOAL MET  3. Pt will report pain at worst as 3/10 or less NOT MET    LTG (7 weeks):  1. Pt will be independent in HEP GOAL MET  2. Pt will improve global BUE strength to be 5/5 without increase in pain GOAL MET  3. Pt will be able to do the dishes without increase in pain GOAL MET  4. Pt will be able to walk for >15 minutes without increase in pain GOAL MET    Plan  Therapy options: independent HEP.  Planned therapy interventions: home exercise program  Treatment plan discussed with: patient      Subjective Evaluation    History of Present Illness  Mechanism of injury: Pt reports that she feels about 70% better since beginning PT. She states that she is no longer having pain when she is doing dishes or house  "things. She states there is still a difficulty with tightness and lack of flexibility in her upper back.   Patient Goals  Patient goals for therapy: decreased pain, return to sport/leisure activities and increased strength  Patient goal: \"find ways to alleviate the pain and learn stretches and utilizing muscles to prevent a pain episode\"  Pain  Pain scale: t/s: 3, l/s: 0.  Pain scale at lowest: t/s: 0, l/s: 0.  Pain scale at highest: t/s: 5, l/s: 2.  Location: between shoulder blades midline t/s  Quality: throbbing, sharp and dull ache  Alleviating factors: forward flexion, scapular retraction, lumbar extension, tennis ball pressure relief.  Aggravating factors: standing (driving, sitting for too long)  Progression: improved    Social Support  Steps to enter house: yes (1 or 2 steps)  Stairs in house: no   Lives in: one-story house  Lives with: fiance, 2 cats.    Employment status: working ()  Hand dominance: right    Treatments  Previous treatment: massage        Objective     Active Range of Motion   Cervical/Thoracic Spine       Cervical    Flexion:  WFL and with pain  Extension:  WFL  Left lateral flexion:  WFL  Right lateral flexion:  WFL  Left rotation:  WFL  Right rotation: Neck active rotation right: tight.    WFL    Thoracic    Flexion:  WFL  Extension:  WFL  Left lateral flexion:  WFL  Right lateral flexion:  WFL  Left rotation:  WFL  Right rotation:  WFL  Left Shoulder   Flexion: WFL  Abduction: WFL  External rotation BTH: WFL  Internal rotation BTB: L4     Right Shoulder   Flexion: WFL  Abduction: WFL  External rotation BTH: WFL  Internal rotation BTB: L4     Joint Play   Joints within functional limits: T5, T6, T7, T8, T9 and T10     Hypermobile: L1, L2, L3, L4 and L5     Hypomobile: T1, T2, T3, T4 and S1     Pain: T3, T4, T7, T8, L4, L5 and S1     Strength/Myotome Testing     Left Shoulder     Planes of Motion   Flexion: 5   Abduction: 5   External rotation at 0°: 5   Internal rotation at " "0°: 5     Isolated Muscles   Latissimus: 4+   Lower trapezius: 4   Middle trapezius: 5     Right Shoulder     Planes of Motion   Flexion: 5   Abduction: 5   External rotation at 0°: 5   Internal rotation at 0°: 5     Isolated Muscles   Latissimus: 4+   Lower trapezius: 4   Middle trapezius: 5     Left Hip   Planes of Motion   Flexion: 5  Abduction: 5    Right Hip   Planes of Motion   Flexion: 5  Abduction: 5    Left Knee   Flexion: 5  Extension: 5    Right Knee   Flexion: 5  Extension: 5    Muscle Activation   Patient unable to activate left transverse abdominals, left multifidus, right transverse abdominals and right multifidus.          Precautions: A/D  Access Code: [email or web address]    POC expires Unit limit Auth Expiration date PT/OT + Visit Limit?   12/8/23 3 12/31/23 36 - 4 visits used already - 32 left                            Visit/Unit Tracking  AUTH Status:  Date 10/17 10/24 11/9 11/30 12/7 12/14 1/11 1/18       Not required Used 1 2 3 4 5 6 7 8        Remaining  31 30 29 28 27 26 25 24             Date 10/17 10/24 11/9 11/30 12/7 12/14 1/11 1/18     Re-Eval     SC        FOTO     61/69        Manuals             t/s CPA      GIII-IV  SC GIII-V + SC        t/s L UPA     GIII-IV  SC         CTJ HVLA    GV  SC         L subscapularis release      SC         Neuro Re-Ed             YTI 4x5 4x5 2x10 Prone 2x10 ea   Prone 1# 2x10      Rows  YTB 2x10 YTB 3x10 YTB 3x10 RTB x8 RTB 2x12 GTB 2x15      B/l shoulder ext  YTB 2x10 YTB 3x10 YTB 3x10 RTB x8 RTB 2x12 GTB 2x15      LPD  YTB 2x10 YTB 2x10 YTB 2x10         Wall push up plus  x10 3x5  3x5 3x8 2x12       Cheerleaders      RTB 2x10 RTB 2x10      Supermans       1# 10\"x5      Bird Dogs       2x10                                Ther Ex             Pt edu     SC - HEP update, POC, and re-eval findings   SC - re-eval findings, POC     UBE backwards for postural training  4 min 3'/3' 3'/3' 6' retro 6' retro  3'/3' 3'/3'     Thoracic ext/flex Sit 3x10 Sit 3x10         " "  open book stretch  5s x15 ea 5\"x10 ea 5\" 10x ea 5\"x10 ea 5\"x10 ea       Thoracic ext butterflies 1/2 foam   X25  25x         LTR     LTR 10\" 10x ea         Lat stretch \"robots\"     5\"x10         Thread the needle c foam roll      5\"x5  5\"x10      Thoracic prayer stretch c foam roll      5\"x5                                  Ther Activity                                       Gait Training                                       Modalities                                                "

## 2024-06-26 NOTE — TELEPHONE ENCOUNTER
----- Message from Radha De Dios, [address] sent at 9/3/2020  3:28 PM EDT -----  Please arrange for colposcopy, pap was LSIL  Thanks  TRANSITIONAL CARE MANAGEMENT - HOSPITAL DISCHARGE FOLLOW-UP    Contacted Ms. Cheng regarding follow-up for ANXIETY after hospital discharge. She was discharged from the hospital on 6/25/24. Review of the After Visit Summary from the recent hospitalization indicates that the patient needs to follow up with PCP.     She feels that she is doing fairly well at home.   Her diet concern is none. Overall, the patient is eating well.   Ambulation: staying the same  Fever: is not present  Pain: none  Activities of Daily Living (global): Self-care   Patient states that she does have sufficient family support. She feels that she is able to ask for assistance when needed.     Additional patient/family concerns: None .    Discharge medications were Verified with the patient. She is fully compliant with the medication regimen prescribed at the time of discharge. She reports that she is not experiencing any medication side effects.    Upon discharge, the patient was to receive no additional services.     Advance care planning documents on file - no    Patient has an appointment on 7/2/24 with Pedro Quinones MD. Ms. Cheng was reminded about the importance of keeping this appointment.

## 2024-07-30 ENCOUNTER — NURSE TRIAGE (OUTPATIENT)
Age: 34
End: 2024-07-30

## 2024-07-30 NOTE — TELEPHONE ENCOUNTER
Regarding: Discharge  ----- Message from Bushra AGUERO sent at 7/30/2024  4:25 PM EDT -----  Pt called stating she has been having a colored discharge for about 2 weeks now. Wanted to speak with a nurse no CTS available at the moment please call pt back to further discuss.

## 2024-07-30 NOTE — TELEPHONE ENCOUNTER
"Brown vaginal discharge with wiping/showering and during intercourse x 2 month.      Recently noted internal pain with intercourse.     Nexplanon inserted 4/2023.  Multiple appts offered, needs afterrnoon appt due to work schedule.  Encouraged HPT to rule out pregnancy.Monitor days of spotting/bleeding with menstrual calendar.   Appt provided for 9/25 1:30 [place] office. Added to wait list.     Message forwarded to clerical to assist with sooner appt    Reason for Disposition   Pain with sexual intercourse (dyspareunia) (Exception: vaginal yeast infection suspected)    Answer Assessment - Initial Assessment Questions  1. DISCHARGE: \"Describe the discharge.\" (e.g., white, yellow, green, gray, foamy, cottage cheese-like)      Dark brown discharge-with wiping/ showering or during intercourse. Looks like small clots  2. ODOR: \"Is there a bad odor?\"      denies  3. ONSET: \"When did the discharge begin?\"      2 weeks  4. RASH: \"Is there a rash in that area?\" If Yes, ask: \"Describe it.\" (e.g., redness, blisters, sores, bumps)      Denies   5. ABDOMINAL PAIN: \"Are you having any abdominal pain?\" If Yes, ask: \"What does it feel like? \" (e.g., crampy, dull, intermittent, constant)       denies  6. ABDOMINAL PAIN SEVERITY: If present, ask: \"How bad is it?\"  (e.g., mild, moderate, severe)   - MILD - doesn't interfere with normal activities    - MODERATE - interferes with normal activities or awakens from sleep    - SEVERE - patient doesn't want to move (R/O peritonitis)       N/a  7. CAUSE: \"What do you think is causing the discharge?\" \"Have you had the same problem before? What happened then?\"      nexplanon  8. OTHER SYMPTOMS: \"Do you have any other symptoms?\" (e.g., fever, itching, vaginal bleeding, pain with urination, injury to genital area, vaginal foreign body)      Denies-notes mild pelvic pain with intercourse  9. PREGNANCY: \"Is there any chance you are pregnant?\" \"When was your last menstrual period?\"      Has not had " menses with nexplanon.    Protocols used: Vaginal Discharge-ADULT-[place]

## 2024-07-31 NOTE — TELEPHONE ENCOUNTER
Called Ms. Jessica to offer her an appointment this morning, July 31, 2024 @9:15am with TIMA Abbasi in the [place] [facility].

## 2024-08-05 ENCOUNTER — OFFICE VISIT (OUTPATIENT)
Dept: OBGYN CLINIC | Facility: CLINIC | Age: 34
End: 2024-08-05
Payer: COMMERCIAL

## 2024-08-05 VITALS — BODY MASS INDEX: 34.72 KG/M2 | DIASTOLIC BLOOD PRESSURE: 70 MMHG | WEIGHT: 196 LBS | SYSTOLIC BLOOD PRESSURE: 100 MMHG

## 2024-08-05 DIAGNOSIS — N92.1 BREAKTHROUGH BLEEDING ON NEXPLANON: Primary | ICD-10-CM

## 2024-08-05 DIAGNOSIS — Z30.46 ENCOUNTER FOR SURVEILLANCE OF NEXPLANON SUBDERMAL CONTRACEPTIVE: ICD-10-CM

## 2024-08-05 DIAGNOSIS — Z97.5 BREAKTHROUGH BLEEDING ON NEXPLANON: Primary | ICD-10-CM

## 2024-08-05 PROBLEM — G47.20 SLEEP PATTERN DISTURBANCE: Status: ACTIVE | Noted: 2021-08-10

## 2024-08-05 PROBLEM — M54.6 CHRONIC MIDLINE THORACIC BACK PAIN: Status: ACTIVE | Noted: 2023-09-25

## 2024-08-05 PROBLEM — E66.812 CLASS 2 OBESITY: Status: ACTIVE | Noted: 2023-09-25

## 2024-08-05 PROBLEM — E66.9 CLASS 2 OBESITY: Status: ACTIVE | Noted: 2023-09-25

## 2024-08-05 PROBLEM — R73.03 PREDIABETES: Status: ACTIVE | Noted: 2023-09-25

## 2024-08-05 PROBLEM — G89.29 CHRONIC MIDLINE THORACIC BACK PAIN: Status: ACTIVE | Noted: 2023-09-25

## 2024-08-05 PROBLEM — F41.9 ANXIETY: Status: ACTIVE | Noted: 2024-02-26

## 2024-08-05 PROBLEM — E55.9 VITAMIN D DEFICIENCY: Status: ACTIVE | Noted: 2024-02-26

## 2024-08-05 PROBLEM — B37.49 CANDIDA INFECTION OF GENITAL REGION: Status: RESOLVED | Noted: 2021-10-28 | Resolved: 2024-08-05

## 2024-08-05 PROBLEM — E53.8 COBALAMIN DEFICIENCY: Status: ACTIVE | Noted: 2023-04-11

## 2024-08-05 LAB — SL AMB POCT URINE HCG: NORMAL

## 2024-08-05 PROCEDURE — 81025 URINE PREGNANCY TEST: CPT | Performed by: NURSE PRACTITIONER

## 2024-08-05 PROCEDURE — 99213 OFFICE O/P EST LOW 20 MIN: CPT | Performed by: NURSE PRACTITIONER

## 2024-08-05 RX ORDER — MELOXICAM 15 MG/1
TABLET ORAL
COMMUNITY
Start: 2024-07-31

## 2024-08-05 RX ORDER — IBUPROFEN 800 MG/1
800 TABLET ORAL EVERY 8 HOURS PRN
Qty: 21 TABLET | Refills: 0 | Status: SHIPPED | OUTPATIENT
Start: 2024-08-05 | End: 2024-08-10

## 2024-08-05 RX ORDER — ETONOGESTREL 68 MG/1
1 IMPLANT SUBCUTANEOUS
COMMUNITY

## 2024-08-05 NOTE — PATIENT INSTRUCTIONS
"Patient Education     Hormonal birth control   The Basics   Written by the doctors and editors at [facility]   What is hormonal birth control? -- This is any pill, injection, device, or treatment that uses hormones to prevent pregnancy. There are a few different kinds of hormonal birth control. Some contain the hormones estrogen and progestin. Others contain only progestin.  While no birth control works 100 percent perfectly all of the time, hormonal methods work very well to prevent pregnancy. The methods differ in how easy they are to use and their side effects (table 1):   Pills - If you choose birth control pills, you need to take a pill every day. Skipping pills can increase the chance of getting pregnant. Birth control pill packets usually include 4 to 7 days of hormone-free pills each month. You get your period during these hormone-free days. If you prefer not to get a period, you can skip the hormone-free pills and take a hormone pill every day instead. This is called \"continuous dosing.\"  Most birth control pills contain estrogen and progestin, but some contain only progestin. One progestin-only pill (brand name: Opill) is available without a prescription in the .   Skin patches - There are a few different patches available (brand names: Xulane, Xafemy, Twirla). You can wear the patch on your shoulder, back, belly, or hip (figure 1). Some can also be worn on the upper arm. You change the patch once a week, and you put it in a new place each time. You typically wear a new patch each week for 3 weeks and then leave the patch off during week 4. You get your period during week 4. Skin patches for birth control contain both estrogen and progestin.   Vaginal rings - This is a flexible ring you put in your vagina (sample brand names: Annovera, NuvaRing) (figure 2). The ring releases the hormones estrogen and progestin.  Do not remove the ring when you have sex. You need to check before and after sex to make sure " "that it is in place. If the ring comes out, make sure that you know how quickly you need to put it back in. This depends on which brand of ring you have. In general, if it comes out for more than a few hours, you need to use another form of birth control to prevent pregnancy.  You typically keep the ring in for 3 weeks. Then, depending on which ring you have, you either throw it away or clean it and store it to put back in later. You do not use the ring during week 4, which is when you have your period.  You can choose to continue using a ring for longer than 3 weeks. If so, you will not have a regular period, although you might have some light bleeding or \"spotting.\"   Injections - If you use hormone injections, you will get a shot in the arm or buttocks every 3 months. Injections for birth control (brand name: Depo-Provera) contain only progestin.   Implants - A birth control implant is a tiny yaya that releases hormones in the arm (figure 3). It must be implanted by a doctor or nurse and can stay in the arm for up to 3 years. Implants for birth control (brand name: Nexplanon) contain only progestin.   Hormone-containing intrauterine device (\"IUD\") - This device is placed inside the uterus to prevent pregnancy (figure 4). Some IUDs work by releasing hormones into the body (brand names: Mirena, Liletta, Kyleena, Maddie). Depending on which hormone-releasing IUD you get and your age, it can stay in place for 3 to 8 years. The hormone-releasing IUDs contain the hormone levonorgestrel, which is a progestin.  Hormonal birth control is a safe and reliable way to prevent pregnancy for most people. But it does not protect you from infections that spread through sex (called \"sexually transmitted infections\" or \"sexually transmitted diseases\").  Why else might a person use hormonal birth control? -- Hormonal birth control has other benefits besides preventing pregnancy. It can make your periods lighter or more regular. For " "this reason, it is also often used in the treatment of certain health conditions, including:   Heavy, irregular, or painful periods - Different things can affect your monthly period. Some people also get painful cramps or other symptoms.   Polycystic ovary syndrome (\"PCOS\") - This condition can cause irregular periods, acne, extra facial hair, or hair loss from the head.   Menstrual migraines - These are migraine headaches that are triggered by hormone changes around the monthly period. Hormonal birth control might be an option for treatment, as long as you do not get migraines with an \"aura.\" An aura is a symptom or feeling that happens before or during the headache. For example, some people see flashing lights, bright spots, or zig-zag lines, or lose part of their vision.  If you have any of these conditions, your doctor or nurse might suggest the pill or another form of hormonal birth control. Do not try using birth control to treat a health condition without talking to your doctor or nurse first.  How do I choose the right hormonal birth control for me? -- Work with your doctor or nurse to choose the best option for you. Think about how likely you are to use each method the right way. Can you remember to take a pill every day? Can you remember to change a patch once a week? Long-acting methods (IUD, implant) are the most convenient because they work for 3 to 10 years, depending on the method. The injection, which works for 3 months, might be more convenient than the pill, patch, or ring. Also, ask your doctor how the method you are thinking about will affect your period. The table has a list of side effects and risks for each of the different forms (table 1).  Is hormonal birth control safe for everyone? -- No. Some people should not use estrogen-containing hormonal birth control. This includes those who:   Are age 35 or older and smoke cigarettes - These things increase your risk for heart attacks and " "strokes.   Could possibly be pregnant - Before prescribing hormonal birth control, your doctor or nurse will ask questions to make sure that you are not pregnant. You might need to take a pregnancy test to confirm this.   Have had blood clots or a stroke in the past   Are being treated for breast cancer, or have had breast cancer before   Have some types of liver disease - Hormonal birth control can make some types of liver disease worse.   Have some types of heart disease   Get the type of migraine headaches that cause vision or hearing problems  If you have high blood pressure, you can still use hormonal birth control. But your blood pressure needs to be well controlled and regularly checked by a doctor.  Many people who can't take estrogen-containing hormonal birth control can take other kinds of hormonal birth control that contain only progestin. Or they can use methods that do not contain hormones.  What if I take medicines besides birth control? -- Some medicines can affect how well hormonal birth control works. These include:   Some medicines used to prevent seizures (called \"anticonvulsants\")   Certain antibiotics used to treat tuberculosis (rifampin and rifabutin)   Halle's Wort (an herbal medicine for depression)  If you take any of these medicines, talk to your doctor about how to handle birth control. Also, if you already take hormonal birth control, tell any doctor or nurse who might be prescribing medicines for you.  What if I forget to use my hormonal birth control? -- If you have sex and forgot to use your birth control, you can take emergency contraception to reduce your risk of pregnancy. Some forms of emergency contraception require a prescription, but you can buy others in a pharmacy. The IUD is the most effective method of emergency contraception, but requires a doctor or nurse to insert it. If you need to use emergency contraception, do it as soon as possible after sex.  All topics are " updated as new evidence becomes available and our peer review process is complete.  This topic retrieved from [email or web address] on: Mar 20, 2024.  Topic 81623 Version 21.0  Release: 32.2.4 - C32.78  © 2024 UpToDate, Inc. and/or its affiliates. All rights reserved.  table 1: Hormonal birth control  Method  Using the method  Some side effects and risks    Implantable yaya   Hormones it contains: Progestin  Expected pregnancies per 100 people in first year of use: Less than 1 Must be inserted by a doctor.  Nothing to do or remember.  Lasts up to 3 years. Most common side effects:   Bleeding between periods  Changes in periods  Other possible side effects:  Headache  Acne  Sore breasts  Weight gain  Mood changes   IUD with progestin   Hormones it contains: Progestin  Expected pregnancies per 100 people in first year of use: Less than 1 Must be inserted by a doctor.  Nothing to do or remember.  Lasts 3 to 8 years depending on type. Most common side effects:   Bleeding between periods  Regular periods may stop  Other possible side effects:  Cramps  Potential but uncommon risks:  IUD can slip out  IUD can damage the uterus  Insertion of IUD can lead to a type of infection that can make it hard to get pregnant after the IUD is removed   Shot/injection   Hormones it contains: Progestin  Expected pregnancies per 100 people in first year of use: 4 to 7 Doctor or nurse must give you a shot every 3 months. Most common side effects:   Bleeding between periods  Regular periods may stop  Other possible side effects:  Temporary bone thinning  Weight gain  Mood changes  Hair loss or increased hair on face or body  Sore breasts  Headache   Progestin-only pill   Hormones it contains: Progestin  Expected pregnancies per 100 people in first year of use: 4 to 7 You must swallow a pill at the same time every day. Most common side effects:   Bleeding between periods (this is more common with progestin-only pills than with combined estrogen-progestin  pills)  Other possible side effects:  Sore breasts  Acne   Combination estrogen-progestin pill   Hormones it contains: Progestin and estrogen  Expected pregnancies per 100 people in first year of use: 4 to 7 You must swallow a pill every day. Most common side effects:   Bleeding between periods  Decreased bleeding during period  Other possible side effects:  Nausea  Changes in mood  Rare but serious risks include:  Heart attack  Stroke  Blood clots  High blood pressure  Liver tumors  Gallstones  Yellowing of the skin or eyes (jaundice)   Patch   Hormones it contains: Progestin and estrogen  Expected pregnancies per 100 people in first year of use: 4 to 7 You must wear a patch on your skin all of the time for 3 weeks. Every week, you change the patch. During the 4th week, you do not use a patch. Side effects and risks are similar to those of combined estrogen-progestin pills, but the patch causes higher average levels of estrogen than most pills. This may increase the risk of blood clots.  Other possible side effects:  Skin irritation where the patch is applied   Vaginal ring   Hormones it contains: Progestin and estrogen  Expected pregnancies per 100 people in first year of use: 4 to 7 You must put the ring into your vagina and leave it in for 3 weeks. During the 4th week, you do not use a ring. Side effects and risks are similar to those of the combined estrogen-progestin pill. (Breast soreness and nausea may be less than with the pill.)  Other possible side effects:  Vaginal discharge or irritation   The methods listed here all require a prescription. This table applies to people without medical problems, such as a history of cancer, blood clots, or liver disease. If you have any medical problems, ask your doctor or nurse whether you should avoid any type of hormonal birth control. If you are breastfeeding, tell your doctor or nurse when you choose birth control.  IUD: intrauterine device.  Graphic 39511 Version  8.0  figure 1: Birth control skin patch     This picture shows a birth control patch on the upper back. You can wear the patch on your shoulder, back, belly, or hip. One type of patch can also be worn on the upper arm.  The skin patch delivers hormones into the body that help prevent pregnancy.  Graphic 14793 Version 9.0  figure 2: Vaginal ring     This picture shows the birth control ring. It is a flexible ring that you put in your vagina for 3 weeks at a time. It delivers hormones into the body that help prevent pregnancy.  Graphic 07903 Version 5.0  figure 3: Birth control implant     This picture shows the shape and size of the birth control implant. It is implanted into the upper arm, where it releases hormones that help prevent pregnancy.  Graphic 67733 Version 5.0  figure 4: IUDs     This picture shows 2 types of IUDs. There are different IUDs available. They are placed inside the uterus to help prevent pregnancy. Some hormonal IUDs can help reduce menstrual bleeding. The copper IUD can actually make menstrual bleeding heavier.  Graphic 20901 Version 15.0  Consumer Information Use and Disclaimer   Disclaimer: This generalized information is a limited summary of diagnosis, treatment, and/or medication information. It is not meant to be comprehensive and should be used as a tool to help the user understand and/or assess potential diagnostic and treatment options. It does NOT include all information about conditions, treatments, medications, side effects, or risks that may apply to a specific patient. It is not intended to be medical advice or a substitute for the medical advice, diagnosis, or treatment of a health care provider based on the health care provider's examination and assessment of a patient's specific and unique circumstances. Patients must speak with a health care provider for complete information about their health, medical questions, and treatment options, including any risks or benefits  regarding use of medications. This information does not endorse any treatments or medications as safe, effective, or approved for treating a specific patient. UpToDate, Inc. and its affiliates disclaim any warranty or liability relating to this information or the use thereof.The use of this information is governed by the Terms of Use, available at https://www.[employer].com/en/know/clinical-effectiveness-terms. 2024© UpToDate, Inc. and its affiliates and/or licensors. All rights reserved.  Copyright   © 2024 UpToDate, Inc. and/or its affiliates. All rights reserved.

## 2024-08-05 NOTE — PROGRESS NOTES
Diagnoses and all orders for this visit:    Breakthrough bleeding on Nexplanon  -     ibuprofen (MOTRIN) 800 mg tablet; Take 1 tablet (800 mg total) by mouth every 8 (eight) hours as needed for moderate pain (with a small meal) for up to 5 days    Encounter for surveillance of Nexplanon subdermal contraceptive  -     POCT urine HCG        Call if no symptom improvement, all questions answered, return for annual.         Pleasant 34 y.o. female patient here for breakthrough bleeding complaints on Nexplanon for the past 3 weeks.  She does admit she is under a lot of stress because of her mom's recent diagnoses of dementia and work is also very stressful.  She denies new sexual partners, getting  in October.  She declines an STD screen.  She has been on Nexplanon for 1 year without issue.  She is overdue for her annual and will return for this.  She denies vaginal vaginal issues.  She denies fever or pelvic pain. She declines a pelvic ultrasound for evaluation.  Pregnancy test was negative today.    Past Medical History:   Diagnosis Date    Anxiety     Depression     No known health problems      Past Surgical History:   Procedure Laterality Date    NO PAST SURGERIES       Social History     Tobacco Use    Smoking status: Former     Current packs/day: 0.00     Types: Cigarettes     Quit date: 2016     Years since quittin.6    Smokeless tobacco: Never   Vaping Use    Vaping status: Never Used   Substance Use Topics    Alcohol use: Not Currently    Drug use: Not Currently     Types: Marijuana     Family History   Problem Relation Age of Onset    Ovarian cancer Maternal Grandmother     Uterine cancer Maternal Grandmother     Cervical cancer Maternal Grandmother     No Known Problems Mother     Clotting disorder Father     No Known Problems Sister     Breast cancer Neg Hx     Colon cancer Neg Hx        Current Outpatient Medications:     ALPRAZolam (XANAX) 0.25 mg tablet, , Disp: , Rfl:     doxycycline  (ADOXA) 100 MG tablet, , Disp: , Rfl:     etonogestrel (Nexplanon) subdermal implant, Inject 1 each under the skin, Disp: , Rfl:     ibuprofen (MOTRIN) 800 mg tablet, Take 1 tablet (800 mg total) by mouth every 8 (eight) hours as needed for moderate pain (with a small meal) for up to 5 days, Disp: 21 tablet, Rfl: 0    meloxicam (MOBIC) 15 mg tablet, , Disp: , Rfl:     Allergies   Allergen Reactions    Bupropion Anaphylaxis, Hives, Itching and Rash     OB History    Para Term  AB Living   1 0     1 0   SAB IAB Ectopic Multiple Live Births     1     0      # Outcome Date GA Lbr Kaden/2nd Weight Sex Type Anes PTL Lv   1 IAB                Vitals:    24 1149   BP: 100/70   Weight: 88.9 kg (196 lb)     Body mass index is 34.72 kg/m².  No LMP recorded.    Review of Systems   Constitutional: Negative for chills, fatigue, fever and unexpected weight change.   Respiratory: Negative for shortness of breath.    Gastrointestinal: Negative for anal bleeding, blood in stool, constipation and diarrhea.   Genitourinary: Negative for difficulty urinating, dysuria and hematuria.     Physical Exam   Constitutional: She appears well-developed and well-nourished. No distress. Alert and oriented.  HENT: atraumatic, EOMI bilaterally  Head: Normocephalic.   Neck: Normal range of motion. Neck supple.   Pulmonary: Effort normal.  Abdominal: Soft.   Pelvic exam was performed with patient supine. No labial fusion. There is no rash, tenderness, lesion or injury on the right labia. There is no rash, tenderness, lesion or injury on the left labia. Urethral meatus does not show any tenderness, inflammation or discharge. Palpation of midline bladder without pain or discomfort. Uterus is not deviated, not enlarged, not fixed and not tender. Cervix exhibits no motion tenderness, no discharge and no friability. Right adnexum displays no mass, no tenderness and no fullness. Left adnexum displays no mass, no tenderness and no fullness.  No erythema or tenderness in the vagina. No foreign body in the vagina. No signs of injury around the vagina. NO Vaginal discharge found. BTB noted, very light. Perineum and anus without areas of injury. No lesions noted or swelling. LEFT ARM NEXPLANON IN PLACE    Lymphadenopathy:        Right: No inguinal adenopathy present.        Left: No inguinal adenopathy present.

## 2024-11-06 ENCOUNTER — ANNUAL EXAM (OUTPATIENT)
Dept: OBGYN CLINIC | Facility: CLINIC | Age: 34
End: 2024-11-06
Payer: COMMERCIAL

## 2024-11-06 VITALS
DIASTOLIC BLOOD PRESSURE: 78 MMHG | SYSTOLIC BLOOD PRESSURE: 114 MMHG | BODY MASS INDEX: 33.31 KG/M2 | HEIGHT: 63 IN | WEIGHT: 188 LBS

## 2024-11-06 DIAGNOSIS — Z97.5 NEXPLANON IN PLACE: ICD-10-CM

## 2024-11-06 DIAGNOSIS — Z01.419 ENCOUNTER FOR ANNUAL ROUTINE GYNECOLOGICAL EXAMINATION: Primary | ICD-10-CM

## 2024-11-06 PROBLEM — N76.1 CHRONIC VAGINITIS: Status: RESOLVED | Noted: 2019-04-05 | Resolved: 2024-11-06

## 2024-11-06 PROCEDURE — 99395 PREV VISIT EST AGE 18-39: CPT | Performed by: NURSE PRACTITIONER

## 2024-11-06 PROCEDURE — G0145 SCR C/V CYTO,THINLAYER,RESCR: HCPCS | Performed by: NURSE PRACTITIONER

## 2024-11-06 PROCEDURE — G0476 HPV COMBO ASSAY CA SCREEN: HCPCS | Performed by: NURSE PRACTITIONER

## 2024-11-06 RX ORDER — DOXYCYCLINE 100 MG/1
CAPSULE ORAL
COMMUNITY
Start: 2024-10-08

## 2024-11-06 NOTE — PATIENT INSTRUCTIONS
Patient Education     Lowering Your Risk of Breast Cancer   About this topic   Breast cancer is a serious illness. Breast cancer is when abnormal cells grow and divide more quickly in your breast. These cells form a growth or tumor. The abnormal cells may enter nearby tissue and spread to other parts of the body. It is the type of cancer most often seen in women. Men can have breast cancer, but it is a rare condition.  General   Some things in your life may increase your risk of breast cancer. You may not be able to change some of these. Others you can control.  You are more likely to get breast cancer if you:  Have a mother, sister, or daughter who has had breast cancer  Have used hormones for menopause for more than 5 years  Have had radiation therapy to the breast or chest in the past  Are overweight or do not exercise  Had your first menstrual period before you were 11 years old  Went through menopause after age 57  Have never been pregnant or had your first child after age 35  Have had breast cancer before  Drink alcohol in any form  Have dense breasts  Are older in age  There is no certain way to prevent breast cancer. There are things you can do to lower your chances of having breast cancer.  Keep a healthy weight. Lose weight if you are overweight. Being overweight raises your chances of having breast cancer.  Eat a healthy diet to maintain a healthy weight, such as more fruits, vegetables, and lean cuts of meat. Decrease the amount of saturated fat in your diet.  Exercise. Being active helps you keep a healthy weight.  Limit your alcohol intake or do not drink alcohol. The more alcohol you drink, the higher your risk.  Do not smoke cigarettes. Smoking can increase your risk of many types of cancer.  Breastfeed your baby. This may help protect you. The longer you breastfeed, the more protection you have.  Talk with your doctor about:  Limiting or stopping hormone therapy.  Taking certain drugs to prevent  breast cancer. For women at high risk of having breast cancer, there are a few drugs that may lower your risk.  Surgery to prevent you from having breast cancer if you are very high risk.  When do I need to call the doctor?   Changes in your breasts  A lump or area in your breast that feels different  Discharge from your nipple  Skin on your breast is dimpled or indented  You have questions or concerns about your breasts  Helpful tips   Talk to your doctor about the best kind of breast cancer screening for you.  If you want to do self breast exams, have your doctor show you the right way to do them.  Tell your doctor of any abnormal finding.  Last Reviewed Date   2021-10-04  Consumer Information Use and Disclaimer   This generalized information is a limited summary of diagnosis, treatment, and/or medication information. It is not meant to be comprehensive and should be used as a tool to help the user understand and/or assess potential diagnostic and treatment options. It does NOT include all information about conditions, treatments, medications, side effects, or risks that may apply to a specific patient. It is not intended to be medical advice or a substitute for the medical advice, diagnosis, or treatment of a health care provider based on the health care provider's examination and assessment of a patient’s specific and unique circumstances. Patients must speak with a health care provider for complete information about their health, medical questions, and treatment options, including any risks or benefits regarding use of medications. This information does not endorse any treatments or medications as safe, effective, or approved for treating a specific patient. UpToDate, Inc. and its affiliates disclaim any warranty or liability relating to this information or the use thereof. The use of this information is governed by the Terms of Use, available at  https://www.[employer].com/en/know/clinical-effectiveness-terms   Copyright   Copyright © 2024 UpToDate, Inc. and its affiliates and/or licensors. All rights reserved.

## 2024-11-06 NOTE — PROGRESS NOTES
Diagnoses and all orders for this visit:    Encounter for annual routine gynecological examination  -     Liquid-based pap, screening    Nexplanon in place  Comments:  Placed 2023    Calcium/vit d inclusion in the diet discussed, call with any issues, SBE reinforced, all concerns addressed.           Pleasant 34 y.o. premenopausal female here for annual exam. She denies any issues with bleeding or her menses.  She denies any further breakthrough bleeding with her Nexplanon which was placed 2023.  She is newly  and feels the stress of the wedding contributed to the breakthrough bleeding.  She has a history of abnormal pap smear, Pap  NIL, PAP in  [facility], Colpo DAVIAN 1 with +ECC.  Last Pap was done on 10/28/2021 neg. A pap with cotest was done today. She denies vaginal issues. She denies pelvic pain. She denies dyspareunia. She denies any issues with her BCM, Nexplanon. She is sexually active without any concerns.     Past Medical History:   Diagnosis Date    Anxiety     Depression     No known health problems      Past Surgical History:   Procedure Laterality Date    NO PAST SURGERIES       Family History   Problem Relation Age of Onset    Ovarian cancer Maternal Grandmother     Uterine cancer Maternal Grandmother     Cervical cancer Maternal Grandmother     No Known Problems Mother     Clotting disorder Father     No Known Problems Sister     Breast cancer Neg Hx     Colon cancer Neg Hx      Social History     Tobacco Use    Smoking status: Former     Current packs/day: 0.00     Types: Cigarettes     Quit date: 2016     Years since quittin.8    Smokeless tobacco: Never   Vaping Use    Vaping status: Never Used   Substance Use Topics    Alcohol use: Not Currently    Drug use: Not Currently     Types: Marijuana       Current Outpatient Medications:     ALPRAZolam (XANAX) 0.25 mg tablet, , Disp: , Rfl:     doxycycline (ADOXA) 100 MG tablet, , Disp: , Rfl:     doxycycline hyclate  "(VIBRAMYCIN) 100 mg capsule, TAKE ONE CAPSULE BY MOUTH ONCE DAILY FOR ACNE. TAKE WITH FOOD., Disp: , Rfl:     etonogestrel (Nexplanon) subdermal implant, Inject 1 each under the skin, Disp: , Rfl:     ibuprofen (MOTRIN) 800 mg tablet, Take 1 tablet (800 mg total) by mouth every 8 (eight) hours as needed for moderate pain (with a small meal) for up to 5 days, Disp: 21 tablet, Rfl: 0    meloxicam (MOBIC) 15 mg tablet, , Disp: , Rfl:   Patient Active Problem List    Diagnosis Date Noted    Anxiety 2024    Nexplanon in place 2024    Vitamin D deficiency 2024    Chronic midline thoracic back pain 2023    Class 2 obesity 2023    Prediabetes 2023    Cobalamin deficiency 2023    Sleep pattern disturbance 08/10/2021    ADHD 2019    Depression 2006       Allergies   Allergen Reactions    Bupropion Anaphylaxis, Hives, Itching and Rash       OB History    Para Term  AB Living   1 0     1 0   SAB IAB Ectopic Multiple Live Births     1     0      # Outcome Date GA Lbr Kaden/2nd Weight Sex Type Anes PTL Lv   1 IAB              She is a  with [employer], lots of stress. She is looking for another job    Vitals:    24 1346   BP: 114/78   Weight: 85.3 kg (188 lb)   Height: 5' 3\" (1.6 m)     Body mass index is 33.3 kg/m².  Patient's last menstrual period was 10/05/2024.    Review of Systems   Constitutional: Negative for chills, fatigue, fever and unexpected weight change.   Respiratory: Negative for shortness of breath.    Gastrointestinal: Negative for anal bleeding, blood in stool, constipation and diarrhea.   Genitourinary: Negative for difficulty urinating, dysuria and hematuria.     Physical Exam   Constitutional: She appears well-developed and well-nourished. No distress.   HENT: atraumatic, EOMI  Head: Normocephalic.   Neck: Normal range of motion. Neck supple.   Pulmonary: Effort normal.  Breasts: bilateral without masses, skin changes or " nipple discharge. Bilaterally soft and warm to touch. No areas of erythema or pain.  Abdominal: Soft.   Pelvic exam was performed with patient supine. No labial fusion. There is no rash, tenderness, lesion or injury on the right labia. There is no rash, tenderness, lesion or injury on the left labia. Urethral meatus does not show any tenderness, inflammation or discharge. Palpation of midline bladder without pain or discomfort.Uterus is not deviated, not enlarged, not fixed and not tender. Cervix exhibits no motion tenderness, no discharge and no friability. Right adnexum displays no mass, no tenderness and no fullness. Left adnexum displays no mass, no tenderness and no fullness. No erythema or tenderness in the vagina. No foreign body in the vagina. No signs of injury around the vagina. No vaginal discharge found. No signs of injury around the vagina or anus. Perineum without lesions, signs of injury, erythema or swelling.  Lymphadenopathy:        Right: No inguinal adenopathy present.        Left: No inguinal adenopathy present.

## 2024-11-07 LAB
HPV HR 12 DNA CVX QL NAA+PROBE: NEGATIVE
HPV16 DNA CVX QL NAA+PROBE: NEGATIVE
HPV18 DNA CVX QL NAA+PROBE: NEGATIVE

## 2024-11-11 LAB
LAB AP GYN PRIMARY INTERPRETATION: NORMAL
Lab: NORMAL

## 2024-11-14 ENCOUNTER — RESULTS FOLLOW-UP (OUTPATIENT)
Age: 34
End: 2024-11-14

## 2025-07-17 ENCOUNTER — PROCEDURE VISIT (OUTPATIENT)
Age: 35
End: 2025-07-17
Payer: COMMERCIAL

## 2025-07-17 VITALS
SYSTOLIC BLOOD PRESSURE: 94 MMHG | DIASTOLIC BLOOD PRESSURE: 70 MMHG | WEIGHT: 197 LBS | HEIGHT: 63 IN | BODY MASS INDEX: 34.91 KG/M2

## 2025-07-17 DIAGNOSIS — Z30.09 BIRTH CONTROL COUNSELING: ICD-10-CM

## 2025-07-17 DIAGNOSIS — Z30.46 ENCOUNTER FOR NEXPLANON REMOVAL: Primary | ICD-10-CM

## 2025-07-17 PROBLEM — Z97.5 NEXPLANON IN PLACE: Status: RESOLVED | Noted: 2024-02-26 | Resolved: 2025-07-17

## 2025-07-17 PROBLEM — Z72.0 TOBACCO USER: Status: ACTIVE | Noted: 2019-02-08

## 2025-07-17 PROBLEM — N63.25 MASS OVERLAPPING MULTIPLE QUADRANTS OF LEFT BREAST: Status: ACTIVE | Noted: 2025-06-30

## 2025-07-17 PROCEDURE — 11982 REMOVE DRUG IMPLANT DEVICE: CPT | Performed by: NURSE PRACTITIONER

## 2025-07-17 NOTE — PROGRESS NOTES
Remove and insert drug implant    Date/Time: 7/17/2025 1:30 PM    Performed by: TIMA Abbasi  Authorized by: TIMA Abbasi    Consent:       Consent obtained:  Verbal      Consent given by:  Patient      Procedural risks discussed:  Bleeding, possible continued pain, infection and possible loss of function      Patient questions answered:  yes       Patient agrees, verbalizes understanding, and wants to proceed:  yes       Educational handouts given:  yes    Indication:       Indication:  presence of non-biodegradable drug delivery implant    Pre-procedure:       Pre-procedure timeout performed:  yes       Prepped with:  povidone-iodine       Local anesthetic:  Lidocaine without epinephrine      The site was cleaned and prepped in a sterile fashion:  yes    Procedure:       Procedure:  Removal      The patient was position surpine with their arm externally rotated and flexed at the elbow with their hand behind their head. The insertion site was chosen 8-10 cm medial to the medical epicondyle, and 3-5 cm posterior the sulcus between the bicep and tricep.:  Yes       Small stab incision was made in arm:  yes       The tip of the implant emerged from the skin incision:  Yes       Adherent tissue was removed with blunt dissection:  Yes       A tissue sheath was encountered and gently incised :  Yes       The implant was grasped with forceps:  Yes       The implant was removed  :  Yes       Site was closed and/or dressed with:  Steri-strips, gauze and band-aid      Patient tolerated procedure well:  Patient tolerated procedure well    Comments:       Left arm was cleansed and stab incision created after local anesthetic took effect. Nexplanon implant was grasped with needle /clamp and removed in its entirety and shown to the pt. Steri strips were applied to the open incision and pressure dressing was applied. All questions answered. Return in 3 months for a menses check. Advised rapid return to  fertility as well.  Birth control handout provided.  Patient wants to think about a tubal.

## 2025-07-17 NOTE — PATIENT INSTRUCTIONS
"Patient Education     Hormonal birth control   The Basics   Written by the doctors and editors at [facility]   What is hormonal birth control? -- This is any pill, injection, device, or treatment that uses hormones to prevent pregnancy. There are a few different kinds of hormonal birth control. Some contain the hormones estrogen and progestin. Others contain only progestin.  While no birth control works 100 percent perfectly all of the time, hormonal methods work very well to prevent pregnancy. The methods differ in how easy they are to use and their side effects (table 1):   Pills - If you choose birth control pills, you need to take a pill every day. Skipping pills can increase the chance of getting pregnant. Birth control pill packets usually include 4 to 7 days of hormone-free pills each month. You get your period during these hormone-free days. If you prefer not to get a period, you can skip the hormone-free pills and take a hormone pill every day instead. This is called \"continuous dosing.\"  Most birth control pills contain estrogen and progestin, but some contain only progestin. One progestin-only pill (brand name: Opill) is available without a prescription in the .   Skin patches - There are a few different patches available (brand names: Xulane, Xafemy, Twirla). You can wear the patch on your shoulder, back, belly, or hip (figure 1). Some can also be worn on the upper arm. You change the patch once a week, and you put it in a new place each time. You typically wear a new patch each week for 3 weeks and then leave the patch off during week 4. You get your period during week 4. Skin patches for birth control contain both estrogen and progestin.   Vaginal rings - This is a flexible ring you put in your vagina (sample brand names: Annovera, NuvaRing) (figure 2). The ring releases the hormones estrogen and progestin.  Do not remove the ring when you have sex. You need to check before and after sex to make sure " "that it is in place. If the ring comes out, make sure that you know how quickly you need to put it back in. This depends on which brand of ring you have. In general, if it comes out for more than a few hours, you need to use another form of birth control to prevent pregnancy.  You typically keep the ring in for 3 weeks. Then, depending on which ring you have, you either throw it away or clean it and store it to put back in later. You do not use the ring during week 4, which is when you have your period.  You can choose to continue using a ring for longer than 3 weeks. If so, you will not have a regular period, although you might have some light bleeding or \"spotting.\"   Injections - If you use hormone injections, you will get a shot in the arm or buttocks every 3 months. Injections for birth control (brand name: Depo-Provera) contain only progestin.   Implants - A birth control implant is a tiny yaya that releases hormones in the arm (figure 3). It must be implanted by a doctor or nurse and can stay in the arm for up to 3 years. Implants for birth control (brand name: Nexplanon) contain only progestin.   Hormone-containing intrauterine device (\"IUD\") - This device is placed inside the uterus to prevent pregnancy (figure 4). Some IUDs work by releasing hormones into the body (brand names: Mirena, Liletta, Kyleena, Maddie). Depending on which hormone-releasing IUD you get and your age, it can stay in place for 3 to 8 years. The hormone-releasing IUDs contain the hormone levonorgestrel, which is a progestin.  Hormonal birth control is a safe and reliable way to prevent pregnancy for most people. But it does not protect you from infections that spread through sex (called \"sexually transmitted infections\" or \"sexually transmitted diseases\").  Why else might a person use hormonal birth control? -- Hormonal birth control has other benefits besides preventing pregnancy. It can make your periods lighter or more regular. For " "this reason, it is also often used in the treatment of certain health conditions, including:   Heavy, irregular, or painful periods - Different things can affect your monthly period. Some people also get painful cramps or other symptoms.   Polycystic ovary syndrome (\"PCOS\") - This condition can cause irregular periods, acne, extra facial hair, or hair loss from the head.   Menstrual migraines - These are migraine headaches that are triggered by hormone changes around the monthly period. Hormonal birth control might be an option for treatment, as long as you do not get migraines with an \"aura.\" An aura is a symptom or feeling that happens before or during the headache. For example, some people see flashing lights, bright spots, or zig-zag lines, or lose part of their vision.  If you have any of these conditions, your doctor or nurse might suggest the pill or another form of hormonal birth control. Do not try using birth control to treat a health condition without talking to your doctor or nurse first.  How do I choose the right hormonal birth control for me? -- Work with your doctor or nurse to choose the best option for you. Think about how likely you are to use each method the right way. Can you remember to take a pill every day? Can you remember to change a patch once a week? Long-acting methods (IUD, implant) are the most convenient because they work for 3 to 10 years, depending on the method. The injection, which works for 3 months, might be more convenient than the pill, patch, or ring. Also, ask your doctor how the method you are thinking about will affect your period. The table has a list of side effects and risks for each of the different forms (table 1).  Is hormonal birth control safe for everyone? -- No. Some people should not use estrogen-containing hormonal birth control. This includes those who:   Are age 35 or older and smoke cigarettes - These things increase your risk for heart attacks and " "strokes.   Could possibly be pregnant - Before prescribing hormonal birth control, your doctor or nurse will ask questions to make sure that you are not pregnant. You might need to take a pregnancy test to confirm this.   Have had blood clots or a stroke in the past   Are being treated for breast cancer, or have had breast cancer before   Have some types of liver disease - Hormonal birth control can make some types of liver disease worse.   Have some types of heart disease   Get the type of migraine headaches that cause vision or hearing problems  If you have high blood pressure, you can still use hormonal birth control. But your blood pressure needs to be well controlled and regularly checked by a doctor.  Many people who can't take estrogen-containing hormonal birth control can take other kinds of hormonal birth control that contain only progestin. Or they can use methods that do not contain hormones.  What if I take medicines besides birth control? -- Some medicines can affect how well hormonal birth control works. These include:   Some medicines used to prevent seizures (called \"anticonvulsants\")   Certain antibiotics used to treat tuberculosis (rifampin and rifabutin)   Halle's Wort (an herbal medicine for depression)  If you take any of these medicines, talk to your doctor about how to handle birth control. Also, if you already take hormonal birth control, tell any doctor or nurse who might be prescribing medicines for you.  What if I forget to use my hormonal birth control? -- If you have sex and forgot to use your birth control, you can take emergency contraception to reduce your risk of pregnancy. Some forms of emergency contraception require a prescription, but you can buy others in a pharmacy. The IUD is the most effective method of emergency contraception, but requires a doctor or nurse to insert it. If you need to use emergency contraception, do it as soon as possible after sex.  All topics are " updated as new evidence becomes available and our peer review process is complete.  This topic retrieved from [employer] on: Mar 20, 2024.  Topic 43708 Version 21.0  Release: 32.2.4 - C32.78  © 2024 UpToDate, Inc. and/or its affiliates. All rights reserved.  table 1: Hormonal birth control  Method  Using the method  Some side effects and risks    Implantable yaya   Hormones it contains: Progestin  Expected pregnancies per 100 people in first year of use: Less than 1 Must be inserted by a doctor.  Nothing to do or remember.  Lasts up to 3 years. Most common side effects:   Bleeding between periods  Changes in periods  Other possible side effects:  Headache  Acne  Sore breasts  Weight gain  Mood changes   IUD with progestin   Hormones it contains: Progestin  Expected pregnancies per 100 people in first year of use: Less than 1 Must be inserted by a doctor.  Nothing to do or remember.  Lasts 3 to 8 years depending on type. Most common side effects:   Bleeding between periods  Regular periods may stop  Other possible side effects:  Cramps  Potential but uncommon risks:  IUD can slip out  IUD can damage the uterus  Insertion of IUD can lead to a type of infection that can make it hard to get pregnant after the IUD is removed   Shot/injection   Hormones it contains: Progestin  Expected pregnancies per 100 people in first year of use: 4 to 7 Doctor or nurse must give you a shot every 3 months. Most common side effects:   Bleeding between periods  Regular periods may stop  Other possible side effects:  Temporary bone thinning  Weight gain  Mood changes  Hair loss or increased hair on face or body  Sore breasts  Headache   Progestin-only pill   Hormones it contains: Progestin  Expected pregnancies per 100 people in first year of use: 4 to 7 You must swallow a pill at the same time every day. Most common side effects:   Bleeding between periods (this is more common with progestin-only pills than with combined estrogen-progestin  pills)  Other possible side effects:  Sore breasts  Acne   Combination estrogen-progestin pill   Hormones it contains: Progestin and estrogen  Expected pregnancies per 100 people in first year of use: 4 to 7 You must swallow a pill every day. Most common side effects:   Bleeding between periods  Decreased bleeding during period  Other possible side effects:  Nausea  Changes in mood  Rare but serious risks include:  Heart attack  Stroke  Blood clots  High blood pressure  Liver tumors  Gallstones  Yellowing of the skin or eyes (jaundice)   Patch   Hormones it contains: Progestin and estrogen  Expected pregnancies per 100 people in first year of use: 4 to 7 You must wear a patch on your skin all of the time for 3 weeks. Every week, you change the patch. During the 4th week, you do not use a patch. Side effects and risks are similar to those of combined estrogen-progestin pills, but the patch causes higher average levels of estrogen than most pills. This may increase the risk of blood clots.  Other possible side effects:  Skin irritation where the patch is applied   Vaginal ring   Hormones it contains: Progestin and estrogen  Expected pregnancies per 100 people in first year of use: 4 to 7 You must put the ring into your vagina and leave it in for 3 weeks. During the 4th week, you do not use a ring. Side effects and risks are similar to those of the combined estrogen-progestin pill. (Breast soreness and nausea may be less than with the pill.)  Other possible side effects:  Vaginal discharge or irritation   The methods listed here all require a prescription. This table applies to people without medical problems, such as a history of cancer, blood clots, or liver disease. If you have any medical problems, ask your doctor or nurse whether you should avoid any type of hormonal birth control. If you are breastfeeding, tell your doctor or nurse when you choose birth control.  IUD: intrauterine device.  Graphic 64104 Version  8.0  figure 1: Birth control skin patch     This picture shows a birth control patch on the upper back. You can wear the patch on your shoulder, back, belly, or hip. One type of patch can also be worn on the upper arm.  The skin patch delivers hormones into the body that help prevent pregnancy.  Graphic 86382 Version 9.0  figure 2: Vaginal ring     This picture shows the birth control ring. It is a flexible ring that you put in your vagina for 3 weeks at a time. It delivers hormones into the body that help prevent pregnancy.  Graphic 40903 Version 5.0  figure 3: Birth control implant     This picture shows the shape and size of the birth control implant. It is implanted into the upper arm, where it releases hormones that help prevent pregnancy.  Graphic 34943 Version 5.0  figure 4: IUDs     This picture shows 2 types of IUDs. There are different IUDs available. They are placed inside the uterus to help prevent pregnancy. Some hormonal IUDs can help reduce menstrual bleeding. The copper IUD can actually make menstrual bleeding heavier.  Graphic 09327 Version 15.0  Consumer Information Use and Disclaimer   Disclaimer: This generalized information is a limited summary of diagnosis, treatment, and/or medication information. It is not meant to be comprehensive and should be used as a tool to help the user understand and/or assess potential diagnostic and treatment options. It does NOT include all information about conditions, treatments, medications, side effects, or risks that may apply to a specific patient. It is not intended to be medical advice or a substitute for the medical advice, diagnosis, or treatment of a health care provider based on the health care provider's examination and assessment of a patient's specific and unique circumstances. Patients must speak with a health care provider for complete information about their health, medical questions, and treatment options, including any risks or benefits  regarding use of medications. This information does not endorse any treatments or medications as safe, effective, or approved for treating a specific patient. UpToDate, Inc. and its affiliates disclaim any warranty or liability relating to this information or the use thereof.The use of this information is governed by the Terms of Use, available at https://www.[employer].com/en/know/clinical-effectiveness-terms. 2024© UpToDate, Inc. and its affiliates and/or licensors. All rights reserved.  Copyright   © 2024 UpToDate, Inc. and/or its affiliates. All rights reserved.

## 2025-07-28 NOTE — PROGRESS NOTES
Assessment/Plan:    No problem-specific Assessment & Plan notes found for this encounter  Diagnoses and all orders for this visit:    Breakthrough bleeding on birth control pills  -     desogestrel-ethinyl estradiol (APRI) 0 15-30 MG-MCG per tablet; Take 1 tablet by mouth daily    Candida infection of genital region  -     fluconazole (DIFLUCAN) 150 mg tablet; Take 1 tablet (150 mg total) by mouth once for 1 dose Once and repeat in 3 days    Other orders  -     ALPRAZolam (XANAX) 0 25 mg tablet  -     escitalopram (LEXAPRO) 5 mg tablet          Subjective:      Patient ID: Syeda Bingham is a 34 y o  female  Pleasant 34 y o  here for vaginal complaints and BTB on ocp  Did not want to change ocp bc was afraid it might make her moodier but understands it will resolve her bleeding issue so she is willing to try it  She is seeing a therapist and is on an antidepressant for her mood  She is also having vaginal itching again  Denies recent antibiotic use  Denies douching  Denies fever, pelvic pain or dyspareunia  +new sexual partners x 1 month, agrees to STD testing  The following portions of the patient's history were reviewed and updated as appropriate:   She  has a past medical history of No known health problems  She   Patient Active Problem List    Diagnosis Date Noted    Chronic vaginitis 04/05/2019    Candida infection of genital region 01/25/2019    Encounter for gynecological examination without abnormal finding 01/25/2019     She  has a past surgical history that includes No past surgeries  Her family history includes Cervical cancer in her maternal grandmother; Clotting disorder in her father; No Known Problems in her mother and sister; Ovarian cancer in her maternal grandmother; Uterine cancer in her maternal grandmother  She  reports that she quit smoking about 3 years ago  She has never used smokeless tobacco  She reports that she drinks alcohol   She reports that she has current or Medication passed protocol.     Medication: metoPROLOL tartrate (LOPRESSOR) 25 MG tablet  passed protocol.   Last office visit date: 03/12/2025  Next appointment scheduled?: Yes 09/17/2025   past drug history  Drug: Marijuana  Current Outpatient Medications   Medication Sig Dispense Refill    ALPRAZolam (XANAX) 0 25 mg tablet       escitalopram (LEXAPRO) 5 mg tablet       desogestrel-ethinyl estradiol (APRI) 0 15-30 MG-MCG per tablet Take 1 tablet by mouth daily 28 tablet 2    fluconazole (DIFLUCAN) 150 mg tablet Take 1 tablet (150 mg total) by mouth once for 1 dose Once and repeat in 3 days 2 tablet 0    nystatin (MYCOSTATIN) cream Apply topically 2 (two) times a day for 7 days To external labia 30 g 1    terconazole (TERAZOL 7) 0 4 % vaginal cream Sig internally in vagina hs and use some on vulva bid (Patient not taking: Reported on 2019) 45 g 0     No current facility-administered medications for this visit  She has No Known Allergies  OB History    Para Term  AB Living   1 0     1 0   SAB TAB Ectopic Multiple Live Births     1     0      # Outcome Date GA Lbr Kaden/2nd Weight Sex Delivery Anes PTL Lv   1 TAB                  Review of Systems   Constitutional: Negative for chills, fatigue, fever and unexpected weight change  HENT: Negative for mouth sores and trouble swallowing  Gastrointestinal: Negative for abdominal distention, blood in stool, constipation and diarrhea  Genitourinary: Positive for menstrual problem  Negative for difficulty urinating, dyspareunia, dysuria, frequency, genital sores, hematuria, pelvic pain, vaginal discharge and vaginal pain  Musculoskeletal: Negative for neck stiffness  Psychiatric/Behavioral: Negative for agitation, confusion, self-injury and suicidal ideas  The patient is not nervous/anxious  Objective:      /70 (BP Location: Right arm, Patient Position: Sitting)   Ht 5' 4" (1 626 m)   Wt 70 8 kg (156 lb)   LMP 2019   Breastfeeding? No   BMI 26 78 kg/m²          Physical Exam   Constitutional: She appears well-developed and well-nourished  She is cooperative  No distress     Pulmonary/Chest: Effort normal    Abdominal: Soft  Hernia confirmed negative in the right inguinal area and confirmed negative in the left inguinal area  Genitourinary: No labial fusion  There is no rash, tenderness, lesion or injury on the right labia  There is no rash, tenderness, lesion or injury on the left labia  Uterus is not deviated, not enlarged, not fixed and not tender  Cervix exhibits no motion tenderness and no friability  Right adnexum displays no mass, no tenderness and no fullness  Left adnexum displays no mass, no tenderness and no fullness  No erythema, tenderness or bleeding in the vagina  No foreign body in the vagina  No signs of injury around the vagina  Vaginal discharge found  Genitourinary Comments: BTB noted today  Lymphadenopathy:        Right: No inguinal adenopathy present  Left: No inguinal adenopathy present  Skin: Skin is warm and dry  She is not diaphoretic